# Patient Record
Sex: MALE | Race: OTHER | Employment: OTHER | URBAN - METROPOLITAN AREA
[De-identification: names, ages, dates, MRNs, and addresses within clinical notes are randomized per-mention and may not be internally consistent; named-entity substitution may affect disease eponyms.]

---

## 2017-04-07 RX ORDER — GLIMEPIRIDE 4 MG/1
TABLET ORAL
Qty: 30 TABLET | Refills: 0 | Status: SHIPPED | OUTPATIENT
Start: 2017-04-07 | End: 2017-05-14

## 2017-04-07 RX ORDER — QUINAPRIL 20 MG/1
TABLET ORAL
Qty: 30 TABLET | Refills: 0 | Status: SHIPPED | OUTPATIENT
Start: 2017-04-07 | End: 2017-05-14

## 2017-04-19 NOTE — TELEPHONE ENCOUNTER
GLIMEPIRIDE 4 MG Oral Tab 30 tablet 0 4/7/2017        Sig: TAKE 1 TABLET BY MOUTH EVERY MORNING BEFORE BREAKFAST      Notes to Pharmacy: WOULD YOU PLEASE REFILL      E-Prescribing Status: Receipt confirmed by pharmacy (4/7/2017  8:03 AM CDT)        Print T

## 2017-05-16 NOTE — TELEPHONE ENCOUNTER
Diabetes Medications; PROTOCOL FAILED. PLEASE ADVISE ON REFILL. THANKS. NIMESH, please call pt and schedule OV. PT WAS SUPPOSED TO F/U IN 11.2016 PER LAST OV NOTE. Thanks.     Protocol Criteria:  · Appointment scheduled in the past 6 months or the next 3 m

## 2017-05-17 NOTE — TELEPHONE ENCOUNTER
Pt informed appt required, pt will call back to book appt. Pharmacy checking status on refill. pls advise if approve or denied at this time?

## 2017-05-18 RX ORDER — QUINAPRIL 20 MG/1
TABLET ORAL
Qty: 30 TABLET | Refills: 0 | Status: SHIPPED | OUTPATIENT
Start: 2017-05-18 | End: 2017-08-18

## 2017-05-18 RX ORDER — GLIMEPIRIDE 4 MG/1
TABLET ORAL
Qty: 30 TABLET | Refills: 0 | Status: SHIPPED | OUTPATIENT
Start: 2017-05-18 | End: 2017-08-18

## 2017-07-25 RX ORDER — GLIMEPIRIDE 4 MG/1
TABLET ORAL
Qty: 30 TABLET | Refills: 0 | OUTPATIENT
Start: 2017-07-25

## 2017-08-18 ENCOUNTER — LAB ENCOUNTER (OUTPATIENT)
Dept: LAB | Age: 67
End: 2017-08-18
Attending: FAMILY MEDICINE
Payer: MEDICARE

## 2017-08-18 ENCOUNTER — OFFICE VISIT (OUTPATIENT)
Dept: FAMILY MEDICINE CLINIC | Facility: CLINIC | Age: 67
End: 2017-08-18

## 2017-08-18 VITALS
SYSTOLIC BLOOD PRESSURE: 207 MMHG | DIASTOLIC BLOOD PRESSURE: 91 MMHG | WEIGHT: 163 LBS | BODY MASS INDEX: 25 KG/M2 | HEART RATE: 44 BPM

## 2017-08-18 DIAGNOSIS — E11.9 TYPE 2 DIABETES MELLITUS WITHOUT COMPLICATION, WITHOUT LONG-TERM CURRENT USE OF INSULIN (HCC): Primary | ICD-10-CM

## 2017-08-18 DIAGNOSIS — I50.9 CONGESTIVE HEART FAILURE, UNSPECIFIED CONGESTIVE HEART FAILURE CHRONICITY, UNSPECIFIED CONGESTIVE HEART FAILURE TYPE: ICD-10-CM

## 2017-08-18 DIAGNOSIS — E11.9 TYPE 2 DIABETES MELLITUS WITHOUT COMPLICATION, WITHOUT LONG-TERM CURRENT USE OF INSULIN (HCC): ICD-10-CM

## 2017-08-18 LAB
ALBUMIN SERPL BCP-MCNC: 3.4 G/DL (ref 3.5–4.8)
ALBUMIN/GLOB SERPL: 1.2 {RATIO} (ref 1–2)
ALP SERPL-CCNC: 102 U/L (ref 32–100)
ALT SERPL-CCNC: 12 U/L (ref 17–63)
ANION GAP SERPL CALC-SCNC: 11 MMOL/L (ref 0–18)
AST SERPL-CCNC: 14 U/L (ref 15–41)
BASOPHILS # BLD: 0.1 K/UL (ref 0–0.2)
BASOPHILS NFR BLD: 1 %
BILIRUB SERPL-MCNC: 1.1 MG/DL (ref 0.3–1.2)
BUN SERPL-MCNC: 13 MG/DL (ref 8–20)
BUN/CREAT SERPL: 10.7 (ref 10–20)
CALCIUM SERPL-MCNC: 9.2 MG/DL (ref 8.5–10.5)
CHLORIDE SERPL-SCNC: 99 MMOL/L (ref 95–110)
CHOLEST SERPL-MCNC: 300 MG/DL (ref 110–200)
CO2 SERPL-SCNC: 25 MMOL/L (ref 22–32)
CREAT SERPL-MCNC: 1.22 MG/DL (ref 0.5–1.5)
CREAT UR-MCNC: 59.5 MG/DL
EOSINOPHIL # BLD: 0.1 K/UL (ref 0–0.7)
EOSINOPHIL NFR BLD: 1 %
ERYTHROCYTE [DISTWIDTH] IN BLOOD BY AUTOMATED COUNT: 13.5 % (ref 11–15)
GLOBULIN PLAS-MCNC: 2.9 G/DL (ref 2.5–3.7)
GLUCOSE SERPL-MCNC: 372 MG/DL (ref 70–99)
HCT VFR BLD AUTO: 39.9 % (ref 41–52)
HDLC SERPL-MCNC: 32 MG/DL
HGB BLD-MCNC: 14 G/DL (ref 13.5–17.5)
LDLC SERPL CALC-MCNC: 230 MG/DL (ref 0–99)
LYMPHOCYTES # BLD: 2.7 K/UL (ref 1–4)
LYMPHOCYTES NFR BLD: 34 %
MCH RBC QN AUTO: 30.4 PG (ref 27–32)
MCHC RBC AUTO-ENTMCNC: 35 G/DL (ref 32–37)
MCV RBC AUTO: 86.7 FL (ref 80–100)
MICROALBUMIN UR-MCNC: 201 MG/DL (ref 0–1.8)
MICROALBUMIN/CREAT UR: 3378.2 MG/G{CREAT} (ref 0–20)
MONOCYTES # BLD: 0.5 K/UL (ref 0–1)
MONOCYTES NFR BLD: 6 %
NEUTROPHILS # BLD AUTO: 4.7 K/UL (ref 1.8–7.7)
NEUTROPHILS NFR BLD: 59 %
NONHDLC SERPL-MCNC: 268 MG/DL
OSMOLALITY UR CALC.SUM OF ELEC: 295 MOSM/KG (ref 275–295)
PLATELET # BLD AUTO: 235 K/UL (ref 140–400)
PMV BLD AUTO: 9.9 FL (ref 7.4–10.3)
POTASSIUM SERPL-SCNC: 5.2 MMOL/L (ref 3.3–5.1)
PROT SERPL-MCNC: 6.3 G/DL (ref 5.9–8.4)
RBC # BLD AUTO: 4.6 M/UL (ref 4.5–5.9)
SODIUM SERPL-SCNC: 135 MMOL/L (ref 136–144)
TRIGL SERPL-MCNC: 192 MG/DL (ref 1–149)
WBC # BLD AUTO: 8 K/UL (ref 4–11)

## 2017-08-18 PROCEDURE — 82043 UR ALBUMIN QUANTITATIVE: CPT

## 2017-08-18 PROCEDURE — 80061 LIPID PANEL: CPT

## 2017-08-18 PROCEDURE — G0463 HOSPITAL OUTPT CLINIC VISIT: HCPCS | Performed by: FAMILY MEDICINE

## 2017-08-18 PROCEDURE — 36415 COLL VENOUS BLD VENIPUNCTURE: CPT

## 2017-08-18 PROCEDURE — 82570 ASSAY OF URINE CREATININE: CPT

## 2017-08-18 PROCEDURE — 80053 COMPREHEN METABOLIC PANEL: CPT

## 2017-08-18 PROCEDURE — 99214 OFFICE O/P EST MOD 30 MIN: CPT | Performed by: FAMILY MEDICINE

## 2017-08-18 PROCEDURE — 83036 HEMOGLOBIN GLYCOSYLATED A1C: CPT

## 2017-08-18 PROCEDURE — 85025 COMPLETE CBC W/AUTO DIFF WBC: CPT

## 2017-08-18 RX ORDER — QUINAPRIL 20 MG/1
TABLET ORAL
Qty: 30 TABLET | Refills: 5 | Status: SHIPPED | OUTPATIENT
Start: 2017-08-18 | End: 2018-06-02

## 2017-08-18 RX ORDER — SIMVASTATIN 40 MG
TABLET ORAL
Qty: 30 TABLET | Refills: 11 | Status: ON HOLD | OUTPATIENT
Start: 2017-08-18 | End: 2018-10-31

## 2017-08-18 RX ORDER — GLIMEPIRIDE 4 MG/1
TABLET ORAL
Qty: 30 TABLET | Refills: 5 | Status: SHIPPED | OUTPATIENT
Start: 2017-08-18 | End: 2018-05-05

## 2017-08-18 RX ORDER — FUROSEMIDE 20 MG/1
20 TABLET ORAL DAILY
Qty: 30 TABLET | Refills: 5 | Status: SHIPPED | OUTPATIENT
Start: 2017-08-18 | End: 2018-07-16

## 2017-08-18 NOTE — PROGRESS NOTES
HPI:    Patient ID: Chuck Pressley is a 78year old male. HPI  Patient presents with:  Diabetes: f/u medication, pt has not taken mediation in a month  CHF  Non compliant patient who rarely attends office visits has run out of his medications for month. diagnosis)  Congestive heart failure, unspecified congestive heart failure chronicity, unspecified congestive heart failure type (hcc)    May continue current medication. Discussed non compliance with office follow up. Labs ordered.  Reviewed last cardiolog

## 2017-08-19 LAB — HBA1C MFR BLD: 12.5 % (ref 4–6)

## 2018-05-15 RX ORDER — GLIMEPIRIDE 4 MG/1
TABLET ORAL
Qty: 30 TABLET | Refills: 0 | Status: SHIPPED | OUTPATIENT
Start: 2018-05-15 | End: 2018-05-22

## 2018-05-18 ENCOUNTER — PATIENT OUTREACH (OUTPATIENT)
Dept: CASE MANAGEMENT | Age: 68
End: 2018-05-18

## 2018-05-18 NOTE — PROGRESS NOTES
Outreached to patient in regards to enrollment to Chronic Care Management program. Left message for patient to return my call at ext. [phone]. Thank you.

## 2018-05-29 NOTE — PROGRESS NOTES
Outreached to patient a second time in regards to enrollment to Chronic Care Management program. Left message for patient to return my call at ext. [phone]. Thank you.

## 2018-06-01 RX ORDER — GLIMEPIRIDE 4 MG/1
TABLET ORAL
Qty: 30 TABLET | Refills: 0 | Status: ON HOLD | OUTPATIENT
Start: 2018-06-01 | End: 2018-10-31

## 2018-06-04 NOTE — TELEPHONE ENCOUNTER
CSS, please contact patient and assist in scheduling overdue f/u (LOV= 8/18/17 and should be seen at least every 6 months for HTN). Once scheduled please route back for refill review.     Hypertensive Medications  Protocol Criteria:  · Appointment scheduled

## 2018-06-11 RX ORDER — QUINAPRIL 20 MG/1
TABLET ORAL
Qty: 30 TABLET | Refills: 0 | Status: ON HOLD | OUTPATIENT
Start: 2018-06-11 | End: 2018-10-31

## 2018-06-11 NOTE — TELEPHONE ENCOUNTER
Spoke with patient (name and  verified), reviewed information, patient verbalized understanding and agrees with plan.   Patient agreed to call back and make appt within the next 30 days, erx sent

## 2018-07-20 RX ORDER — FUROSEMIDE 20 MG/1
20 TABLET ORAL DAILY
Qty: 30 TABLET | Refills: 0 | Status: ON HOLD | OUTPATIENT
Start: 2018-07-20 | End: 2018-10-31

## 2018-07-20 RX ORDER — GLIMEPIRIDE 4 MG/1
TABLET ORAL
Qty: 30 TABLET | Refills: 0 | Status: SHIPPED | OUTPATIENT
Start: 2018-07-20 | End: 2018-10-24

## 2018-09-25 NOTE — TELEPHONE ENCOUNTER
Failed per nursing protocol - please advise on refill request     Diabetes Medications  Protocol Criteria:  · Appointment scheduled in the past 6 months or the next 3 months  · A1C < 7.5 in the past 6 months  · Creatinine in the past 12 months  · Creatini Office Visit    2 years ago Type II or unspecified type diabetes mellitus without mention of complication, not stated as uncontrolled [facility])    [facility], [facility], [address], [place]    Office Visit    2 years ago Localized edema

## 2018-10-24 ENCOUNTER — APPOINTMENT (OUTPATIENT)
Dept: GENERAL RADIOLOGY | Facility: HOSPITAL | Age: 68
DRG: 242 | End: 2018-10-24
Attending: EMERGENCY MEDICINE
Payer: MEDICARE

## 2018-10-24 ENCOUNTER — APPOINTMENT (OUTPATIENT)
Dept: ULTRASOUND IMAGING | Facility: HOSPITAL | Age: 68
DRG: 242 | End: 2018-10-24
Attending: HOSPITALIST
Payer: MEDICARE

## 2018-10-24 ENCOUNTER — HOSPITAL ENCOUNTER (INPATIENT)
Facility: HOSPITAL | Age: 68
LOS: 7 days | Discharge: HOME HEALTH | DRG: 242 | End: 2018-10-31
Attending: EMERGENCY MEDICINE | Admitting: HOSPITALIST
Payer: MEDICARE

## 2018-10-24 ENCOUNTER — OFFICE VISIT (OUTPATIENT)
Dept: FAMILY MEDICINE CLINIC | Facility: CLINIC | Age: 68
End: 2018-10-24
Payer: MEDICARE

## 2018-10-24 VITALS
SYSTOLIC BLOOD PRESSURE: 179 MMHG | HEART RATE: 56 BPM | RESPIRATION RATE: 16 BRPM | DIASTOLIC BLOOD PRESSURE: 68 MMHG | TEMPERATURE: 98 F

## 2018-10-24 DIAGNOSIS — R73.9 HYPERGLYCEMIA: Primary | ICD-10-CM

## 2018-10-24 DIAGNOSIS — R55 SYNCOPE AND COLLAPSE: ICD-10-CM

## 2018-10-24 DIAGNOSIS — E11.65 UNCONTROLLED DIABETES MELLITUS WITH COMPLICATIONS (HCC): ICD-10-CM

## 2018-10-24 DIAGNOSIS — I50.9 ACUTE ON CHRONIC CONGESTIVE HEART FAILURE, UNSPECIFIED HEART FAILURE TYPE (HCC): ICD-10-CM

## 2018-10-24 DIAGNOSIS — R77.8 ELEVATED TROPONIN: ICD-10-CM

## 2018-10-24 DIAGNOSIS — E11.8 UNCONTROLLED DIABETES MELLITUS WITH COMPLICATIONS (HCC): ICD-10-CM

## 2018-10-24 PROBLEM — R79.89 ELEVATED TROPONIN: Status: ACTIVE | Noted: 2018-10-24

## 2018-10-24 PROCEDURE — 99213 OFFICE O/P EST LOW 20 MIN: CPT | Performed by: FAMILY MEDICINE

## 2018-10-24 PROCEDURE — 76775 US EXAM ABDO BACK WALL LIM: CPT | Performed by: HOSPITALIST

## 2018-10-24 PROCEDURE — 72110 X-RAY EXAM L-2 SPINE 4/>VWS: CPT | Performed by: EMERGENCY MEDICINE

## 2018-10-24 PROCEDURE — 99223 1ST HOSP IP/OBS HIGH 75: CPT | Performed by: HOSPITALIST

## 2018-10-24 PROCEDURE — 71045 X-RAY EXAM CHEST 1 VIEW: CPT | Performed by: EMERGENCY MEDICINE

## 2018-10-24 PROCEDURE — G0463 HOSPITAL OUTPT CLINIC VISIT: HCPCS | Performed by: FAMILY MEDICINE

## 2018-10-24 RX ORDER — ONDANSETRON 2 MG/ML
4 INJECTION INTRAMUSCULAR; INTRAVENOUS EVERY 6 HOURS PRN
Status: DISCONTINUED | OUTPATIENT
Start: 2018-10-24 | End: 2018-10-31

## 2018-10-24 RX ORDER — NITROGLYCERIN 0.4 MG/1
0.4 TABLET SUBLINGUAL EVERY 5 MIN PRN
Status: DISCONTINUED | OUTPATIENT
Start: 2018-10-24 | End: 2018-10-24

## 2018-10-24 RX ORDER — ASPIRIN 81 MG/1
324 TABLET, CHEWABLE ORAL ONCE
Status: COMPLETED | OUTPATIENT
Start: 2018-10-24 | End: 2018-10-24

## 2018-10-24 RX ORDER — HYDRALAZINE HYDROCHLORIDE 20 MG/ML
10 INJECTION INTRAMUSCULAR; INTRAVENOUS ONCE
Status: COMPLETED | OUTPATIENT
Start: 2018-10-24 | End: 2018-10-24

## 2018-10-24 RX ORDER — HEPARIN SODIUM 5000 [USP'U]/ML
5000 INJECTION, SOLUTION INTRAVENOUS; SUBCUTANEOUS EVERY 12 HOURS SCHEDULED
Status: DISCONTINUED | OUTPATIENT
Start: 2018-10-24 | End: 2018-10-31

## 2018-10-24 RX ORDER — ASPIRIN 325 MG
325 TABLET ORAL DAILY
Status: DISCONTINUED | OUTPATIENT
Start: 2018-10-25 | End: 2018-10-31

## 2018-10-24 RX ORDER — SODIUM CHLORIDE 0.9 % (FLUSH) 0.9 %
10 SYRINGE (ML) INJECTION AS NEEDED
Status: DISCONTINUED | OUTPATIENT
Start: 2018-10-24 | End: 2018-10-26

## 2018-10-24 RX ORDER — SODIUM CHLORIDE 0.9 % (FLUSH) 0.9 %
3 SYRINGE (ML) INJECTION AS NEEDED
Status: DISCONTINUED | OUTPATIENT
Start: 2018-10-24 | End: 2018-10-31

## 2018-10-24 RX ORDER — ACETAMINOPHEN 325 MG/1
650 TABLET ORAL EVERY 6 HOURS PRN
Status: DISCONTINUED | OUTPATIENT
Start: 2018-10-24 | End: 2018-10-31

## 2018-10-24 RX ORDER — FUROSEMIDE 10 MG/ML
40 INJECTION INTRAMUSCULAR; INTRAVENOUS
Status: DISCONTINUED | OUTPATIENT
Start: 2018-10-24 | End: 2018-10-28

## 2018-10-24 RX ORDER — MORPHINE SULFATE 4 MG/ML
4 INJECTION, SOLUTION INTRAMUSCULAR; INTRAVENOUS ONCE
Status: COMPLETED | OUTPATIENT
Start: 2018-10-24 | End: 2018-10-24

## 2018-10-24 RX ORDER — FUROSEMIDE 10 MG/ML
40 INJECTION INTRAMUSCULAR; INTRAVENOUS ONCE
Status: COMPLETED | OUTPATIENT
Start: 2018-10-24 | End: 2018-10-24

## 2018-10-24 RX ORDER — NITROGLYCERIN 0.4 MG/1
0.4 TABLET SUBLINGUAL EVERY 5 MIN PRN
Status: DISCONTINUED | OUTPATIENT
Start: 2018-10-24 | End: 2018-10-31

## 2018-10-24 NOTE — PROGRESS NOTES
HPI:    Patient ID: Carin Kitchen is a 80year old male. Pt presents to office today after a ? Seizure yesterday night. Pt claims he felt back abarca and was unable to get up. Pt had been shaking but claims he did not lose consciousness.  Paramedics arrive normal heart sounds and intact distal pulses. Pulmonary/Chest: Effort normal and breath sounds normal.   Neurological: He is alert and oriented to person, place, and time. unsteady   Psychiatric: He has a normal mood and affect. Vitals reviewed.

## 2018-10-24 NOTE — CONSULTS
Cardiology (consult dictated)    Assessment:  1. Acute on chronic congestive heart failure, manifested primarily by severe lower extremity edema. 2.  Acute kidney injury    3. Rhabdomyolysis    4. Marked dyslipidemia    5.   Uncontrolled diabetes    6

## 2018-10-24 NOTE — ED PROVIDER NOTES
Patient Seen in: [facility] Emergency Department    History   Patient presents with:  Hyperglycemia (metabolic)    Stated Complaint: AMS, BS >500    HPI    52-year-old male with history of hypertension, diabetes, coronary artery disease post CABG complaint: AMS, BS >500  Other systems are as noted in HPI. Constitutional and vital signs reviewed. All other systems reviewed and negative except as noted above.     Physical Exam     ED Triage Vitals   BP 10/24/18 1624 (!) 176/59   Pulse 10/24/18 1 other components within normal limits   BNP (B TYPE NATRIUERTIC PEPTIDE) - Abnormal; Notable for the following components:    Beta Natriuretic Peptide 811 (*)     All other components within normal limits   TROPONIN I - Abnormal; Notable for the following Report. Rate: 67  Rhythm: Undetermined rhythm  Axis: Normal  Reading: Right bundle branch block nonspecific EKG                  MDM   Lab, x-ray, and EKG results noted. Patient started on an insulin drip in the emergency department.   Will also give [facility] ADULT MENTAL [facility]

## 2018-10-24 NOTE — ED INITIAL ASSESSMENT (HPI)
Pt is noncompliant diabetic who was found on the floor last night by his wife. There was an unknown down time and patient does not know what happened. Blood sugar over 500 yesterday.   Refused transport by paramedics last night and sent from PMD's office

## 2018-10-25 ENCOUNTER — APPOINTMENT (OUTPATIENT)
Dept: CV DIAGNOSTICS | Facility: HOSPITAL | Age: 68
DRG: 242 | End: 2018-10-25
Attending: HOSPITALIST
Payer: MEDICARE

## 2018-10-25 ENCOUNTER — APPOINTMENT (OUTPATIENT)
Dept: ULTRASOUND IMAGING | Facility: HOSPITAL | Age: 68
DRG: 242 | End: 2018-10-25
Attending: INTERNAL MEDICINE
Payer: MEDICARE

## 2018-10-25 PROCEDURE — 99233 SBSQ HOSP IP/OBS HIGH 50: CPT | Performed by: HOSPITALIST

## 2018-10-25 PROCEDURE — 93306 TTE W/DOPPLER COMPLETE: CPT | Performed by: HOSPITALIST

## 2018-10-25 PROCEDURE — 93880 EXTRACRANIAL BILAT STUDY: CPT | Performed by: INTERNAL MEDICINE

## 2018-10-25 PROCEDURE — 99222 1ST HOSP IP/OBS MODERATE 55: CPT | Performed by: INTERNAL MEDICINE

## 2018-10-25 RX ORDER — HYDRALAZINE HYDROCHLORIDE 25 MG/1
25 TABLET, FILM COATED ORAL EVERY 8 HOURS SCHEDULED
Status: DISCONTINUED | OUTPATIENT
Start: 2018-10-25 | End: 2018-10-31

## 2018-10-25 RX ORDER — AMLODIPINE BESYLATE 5 MG/1
5 TABLET ORAL
Status: DISCONTINUED | OUTPATIENT
Start: 2018-10-25 | End: 2018-10-31

## 2018-10-25 RX ORDER — HYDRALAZINE HYDROCHLORIDE 25 MG/1
25 TABLET, FILM COATED ORAL ONCE
Status: COMPLETED | OUTPATIENT
Start: 2018-10-25 | End: 2018-10-25

## 2018-10-25 RX ORDER — 0.9 % SODIUM CHLORIDE 0.9 %
VIAL (ML) INJECTION
Status: COMPLETED
Start: 2018-10-25 | End: 2018-10-25

## 2018-10-25 RX ORDER — DEXTROSE MONOHYDRATE 25 G/50ML
50 INJECTION, SOLUTION INTRAVENOUS AS NEEDED
Status: DISCONTINUED | OUTPATIENT
Start: 2018-10-25 | End: 2018-10-31

## 2018-10-25 RX ORDER — POTASSIUM CHLORIDE 20 MEQ/1
40 TABLET, EXTENDED RELEASE ORAL EVERY 4 HOURS
Status: COMPLETED | OUTPATIENT
Start: 2018-10-25 | End: 2018-10-25

## 2018-10-25 RX ORDER — 0.9 % SODIUM CHLORIDE 0.9 %
VIAL (ML) INJECTION
Status: DISPENSED
Start: 2018-10-25 | End: 2018-10-26

## 2018-10-25 RX ORDER — AMLODIPINE BESYLATE 5 MG/1
5 TABLET ORAL ONCE
Status: COMPLETED | OUTPATIENT
Start: 2018-10-25 | End: 2018-10-25

## 2018-10-25 NOTE — CARDIAC REHAB
CARDIAC REHAB HEART FAILURE EDUCATION    Handouts provided and reviewed: Tucker Moran Activity: Chair for all meals:        Ambulation:        Tolerated Activity:          Disease Process: Disease process reviewed.     Reviewed the following: DAILY WEIGHT [place]

## 2018-10-25 NOTE — H&P
[facility]    PATIENT'S NAME: Ho [place] PHYSICIAN: Vikas Tapia MD   PATIENT ACCOUNT#:   [id number]    LOCATION:  [place] [address]  MEDICAL RECORD #:   [id number]       YOB: 1950  ADMISSION DATE:       10/24/20 FAMILY HISTORY:  Positive for coronary artery disease. SOCIAL HISTORY:  Ex-tobacco user. No current tobacco, alcohol, or drug use. Lives with his wife and is usually independent with basic activities of daily living.     REVIEW OF SYSTEMS:  The kristine patient has been on quinapril I will try to avoid beta blockers or ACE inhibitors for now considering his bradycardia and kidney function. Use hydralazine as needed. Further recommendations to follow.     Dictated By Angela Betts MD  d: 10/24/2018 18:3

## 2018-10-25 NOTE — PROGRESS NOTES
[facility]    Progress Note    Mk Jean Patient Status:  Inpatient    1950 MRN [id number]   Location [facility] 2W/SW Attending Samreen Jones MD   Hosp Day # 1 PCP Bela Shannon DO       Subjective:    Mk Jean  (H) 07/14/2016    MG 2.0 10/25/2018    TROP 0.32 (HH) 10/25/2018    CK 3,169 (H) 10/24/2018       Xr Lumbar Spine (min 4 Views) (cpt=72110)    Result Date: 10/24/2018  CONCLUSION:  1. Scoliosis. 2. Demineralization.  3. Moderately advanced osteoarth Diabetes  -secondary to non-compliance  -plan levemir/novolog and cf  -blair endo eval    Acute Kidney Failure  -improving     CAD s/p CABG    Dyslipidemia    HTN  -cont amlodipine and   Carotid stenosis  -will need to be addressed    Medication non-complian

## 2018-10-25 NOTE — PLAN OF CARE
SKIN/TISSUE INTEGRITY - ADULT    • Skin integrity remains intact Not Progressing    Legs with plus 4 lower pitting edema. Multiple open blisters and abrasions noted to bilateral  Extremities. Lasix IVP as ordered. Raise legs up on \pillows.        Danica Em optimal ventilation and oxygenation Progressing    Room Air. Encourage sitting up in chair, coughing and deep breathing.      RISK FOR INFECTION - ADULT    • Absence of fever/infection during anticipated neutropenic period Progressing        SAFETY ADULT -

## 2018-10-25 NOTE — PLAN OF CARE
Problem: Patient Centered Care  Goal: Patient preferences are identified and integrated in the patient's plan of care  Interventions:  - What would you like us to know as we care for you? People call me Román for short.    - Provide timely, complete, and ac trends  - Monitor for bleeding, hypotension and signs of decreased cardiac output  - Evaluate effectiveness of vasoactive medications to optimize hemodynamic stability  - Monitor arterial and/or venous puncture sites for bleeding and/or hematoma  - Assess Advance diet as tolerated, if ordered  - Obtain nutritional consult as needed  - Evaluate fluid balance  Outcome: Progressing    Goal: Maintains adequate nutritional intake (undernourished)  INTERVENTIONS:  - Monitor percentage of each meal consumed  - Griselda Ill ADULT - FALL  Goal: Free from fall injury  INTERVENTIONS:  - Assess pt frequently for physical needs  - Identify cognitive and physical deficits and behaviors that affect risk of falls.   - [place] fall precautions as indicated by assessment.  - Educate p

## 2018-10-25 NOTE — CONSULTS
[facility]    Report of Consultation    Daisy Kowalski Patient Status:  Inpatient    1950 MRN [id number]   Location [facility] 2W/SW Attending Yamilex Lorenzo MD   Hosp Day # 1 PCP Jordan Lees DO     Date of Admission: Subcutaneous, TID CC  •  dextrose 50 % injection 50 mL, 50 mL, Intravenous, PRN  •  Glucose-Vitamin C (DEX-4) 4-6 GM-MG chewable tab 4 tablet, 4 tablet, Oral, Q15 Min PRN  •  glucose (DEX4) oral liquid 15 g, 15 g, Oral, Q15 Min PRN  •  Insulin Aspart Pen ( (coronary artery disease)     Hypercholesteremia     Atrial fibrillation (HCC)     HTN (hypertension)     Hyperglycemia     Elevated troponin     Acute on chronic congestive heart failure, unspecified heart failure type ([facility] [address]     [facility]      1

## 2018-10-25 NOTE — PROGRESS NOTES
[facility]  Progress Note    Huy Smith Patient Status:  Inpatient    1950 MRN [id number]   Location [facility] 2W/SW Attending Kristi Archuleta MD   Hosp Day # 1 PCP Kayla Crawford DO     Assessment:    1.   Congestive heart fail 2+ no bruits. Cardiac: Regular rate and rhythm, S1, S2 normal, no murmur  Lungs: Clear without wheezes, rales, rhonchi. Normal excursions and effort. Abdomen: Soft, non-tender. Extremities: 1+ pedal/ankle edema  Skin: Warm and dry.      Labs:  Lab Resu

## 2018-10-25 NOTE — CONSULTS
[facility]    PATIENT'S NAME: [place], [place]   ATTENDING PHYSICIAN: Jose Le MD   CONSULTING PHYSICIAN: Meme Nunez.  Ferny Paniagua MD   PATIENT ACCOUNT#:   [id number]    LOCATION:  [place] [address]  MEDICAL RECORD #:   [id number]       DATE OF BIRTH: EXAMINATION:    GENERAL:  Well-developed, well-nourished male in no acute distress. Alert and oriented x3. VITAL SIGNS:  Blood pressure is 185/93; pulse 55, regular rhythm; respirations 16, breathing unlabored; afebrile; saturation 99%.     HEENT:  Zina Kawasaki inhibitor. Treat blood pressure with hydralazine and amlodipine. Thank you for this consultation. Dictated By Ashly Todd.  Celia Rodriguez MD  d: 10/24/2018 18:47:49  t: 10/24/2018 02:86:79  Hedy Krabbe [id number]  [facility]/

## 2018-10-25 NOTE — DIABETES ED
[facility] - [facility]    Diabetes Education  Note    Allayne Medici Patient Status:  Inpatient   1950 MRN [id number]  Location [facility] 2W/SW Attending Estelle Boyd MD  Hosp Day # 1 PCP Unique Cohn DO    Discussion:  Met with

## 2018-10-26 PROCEDURE — 99233 SBSQ HOSP IP/OBS HIGH 50: CPT | Performed by: HOSPITALIST

## 2018-10-26 PROCEDURE — 99232 SBSQ HOSP IP/OBS MODERATE 35: CPT | Performed by: INTERNAL MEDICINE

## 2018-10-26 RX ORDER — POTASSIUM CHLORIDE 20 MEQ/1
40 TABLET, EXTENDED RELEASE ORAL EVERY 4 HOURS
Status: COMPLETED | OUTPATIENT
Start: 2018-10-26 | End: 2018-10-26

## 2018-10-26 NOTE — PROGRESS NOTES
Transferred pt to telemetry room 342, on bed, report called previously to [facility], update given.

## 2018-10-26 NOTE — CONSULTS
[facility]    Cardiac Electrophysiology Consultation  [facility]    Ted Chris Location: [facility] 2W/    1950 MRN [id number]   Consulting Date 10/26/2018 [facility] [id number]   Consulting Physician Magy Patel MD Attending y (20 MG TOTAL) BY MOUTH DAILY. Disp: 30 tablet Rfl: 0   QUINAPRIL HCL 20 MG Oral Tab TAKE 1 TABLET (20 MG TOTAL) BY MOUTH NIGHTLY.  Disp: 30 tablet Rfl: 0   GLIMEPIRIDE 4 MG Oral Tab TAKE 1 TABLET BY MOUTH EVERY MORNING BEFORE BREAKFAST Disp: 30 tablet Rfl: calm.    Data:  ECG: sinus bradycardia 54 bpm, RBBB/LAFB  TELE: sinus, type 1 second degree AV block, RBBB/lAFB, no pauses  ECHO: EF 55-60%    Lab Results   Component Value Date    WBC 8.5 10/26/2018    HGB 11.0 10/26/2018    HCT 30.9 10/26/2018

## 2018-10-26 NOTE — PLAN OF CARE
CARDIOVASCULAR - ADULT    • Maintains optimal cardiac output and hemodynamic stability Not Progressing    • Absence of cardiac arrhythmias or at baseline Not Progressing        Impaired Activities of Daily Living    • Achieve highest/safest level of indepe

## 2018-10-26 NOTE — PROGRESS NOTES
[facility]  Progress Note    Ambardaisha Hernandez Patient Status:  Inpatient    1950 MRN [id number]   Location [facility] 2W/SW Attending Julia Joe MD   Hosp Day # 2 PCP Lulu Clark DO     Assessment:    1.   Congestive heart fail at 10/26/2018 0835  Last data filed at 10/26/2018 0500  Gross per 24 hour   Intake 235 ml   Output 2500 ml   Net -2265 ml       Wt Readings from Last 2 Encounters:  10/26/18 : 141 lb 14.4 oz (64.4 kg)  08/18/17 : 163 lb (73.9 kg)      Physical Exam:    Gen

## 2018-10-26 NOTE — PROGRESS NOTES
[facility]    Progress Note    Den Carrington Patient Status:  Inpatient    1950 MRN [id number]   Location [facility] 2W/SW Attending Pérez Anthony MD   Hosp Day # 2 PCP Giuseppe Richardson DO     Subjective:  Feels okay  Ap DM management. PLAN;  - Levemir : decrease to 24 daily  - novolog : 5 along with medium  dose CF with meals  - Accuchecks AC and HS  - hypoglycemia protocol  - Diabetes diet    Discussed importance of compliance with medication and regular FU .      We

## 2018-10-26 NOTE — PLAN OF CARE
Absence of cardiac arrhythmias or at baseline Not Progressing    Pt remains bradycardic w/Wenchebach heart block, pt did have dizziness when stood from dangle position, SBP dropped approx 20, no change sx's once sitting in chair & BP stablized, later becam

## 2018-10-26 NOTE — H&P (VIEW-ONLY)
[facility]    Cardiac Electrophysiology Consultation  ALANIS Ramon Dempsey Location: [facility] 2W/    1950 MRN [id number]   Consulting Date 10/26/2018 [facility] [id number]   Consulting Physician Daisy Kimble MD Attending Phy (20 MG TOTAL) BY MOUTH DAILY. Disp: 30 tablet Rfl: 0   QUINAPRIL HCL 20 MG Oral Tab TAKE 1 TABLET (20 MG TOTAL) BY MOUTH NIGHTLY.  Disp: 30 tablet Rfl: 0   GLIMEPIRIDE 4 MG Oral Tab TAKE 1 TABLET BY MOUTH EVERY MORNING BEFORE BREAKFAST Disp: 30 tablet Rfl: calm.    Data:  ECG: sinus bradycardia 54 bpm, RBBB/LAFB  TELE: sinus, type 1 second degree AV block, RBBB/lAFB, no pauses  ECHO: EF 55-60%    Lab Results   Component Value Date    WBC 8.5 10/26/2018    HGB 11.0 10/26/2018    HCT 30.9 10/26/2018

## 2018-10-26 NOTE — PROGRESS NOTES
[facility]    Progress Note    Carin Patch Patient Status:  Inpatient    1950 MRN [id number]   Location [facility] 2W/SW Attending Justin Cleaning MD   Hosp Day # 2 PCP Samira Bear DO       Subjective:    Carin Patch TSH 4.39 10/24/2018    TSH 3.93 10/24/2018     (H) 07/14/2016    MG 1.9 10/25/2018    TROP 0.32 (HH) 10/25/2018    CK 3,169 (H) 10/24/2018       Xr Lumbar Spine (min 4 Views) (cpt=72110)    Result Date: 10/24/2018  CONCLUSION:  1. Scoliosis.  2. D of 10/24/2018 18:11:58 No change Electronically signed on 10/25/2018 at 12:33 by Patricia Cooney MD    Ekg 12-lead    Result Date: 10/24/2018  ECG Report  Interpretation  -------------------------- possible atrial fibrillation (difficult to see distinct ALYSHA moreno LOULOU.

## 2018-10-26 NOTE — CARDIAC REHAB
CARDIAC REHAB HEART FAILURE EDUCATION    Handouts provided and reviewed: [facility] Booklet. Activity: Chair for all meals: Reviewed       Ambulation: Reviewed       Tolerated Activity:          Disease Process: Disease process reviewed.

## 2018-10-27 PROCEDURE — 99232 SBSQ HOSP IP/OBS MODERATE 35: CPT | Performed by: INTERNAL MEDICINE

## 2018-10-27 PROCEDURE — 99233 SBSQ HOSP IP/OBS HIGH 50: CPT | Performed by: HOSPITALIST

## 2018-10-27 RX ORDER — MAGNESIUM OXIDE 400 MG (241.3 MG MAGNESIUM) TABLET
400 TABLET ONCE
Status: COMPLETED | OUTPATIENT
Start: 2018-10-27 | End: 2018-10-27

## 2018-10-27 NOTE — PROGRESS NOTES
[facility]  Progress Note    Mike Kaye Patient Status:  Inpatient    1950 MRN [id number]   Location [facility] 3W/SW Attending Zeb Bauman MD   Hosp Day # 3 PCP Laly Escobar DO     Assessment:  1.   AV block - at times on te HGB 10.5 10/27/2018    HCT 30.3 10/27/2018     10/27/2018     No results found for: PT, INR  Lab Results   Component Value Date     10/27/2018    K 4.6 10/27/2018     10/27/2018    CO2 22 10/27/2018    BUN 27 10/27/2018    CREATSERUM 2. 0

## 2018-10-27 NOTE — PROGRESS NOTES
[facility]    Progress Note    Pepitoas Mansoor Patient Status:  Inpatient    1950 MRN [id number]   Location [facility] 2W/SW Attending Scotty Muro MD   Hosp Day # 3 PCP Frank Garibay DO       Subjective:    Carmine Max 10/24/2018    TSH 4.39 10/24/2018    TSH 3.93 10/24/2018     (H) 07/14/2016    MG 1.8 10/27/2018    TROP 0.32 (HH) 10/25/2018    CK 3,169 (H) 10/24/2018           Ekg 12-lead    Result Date: 10/25/2018  ECG Report  Interpretation  ------------------

## 2018-10-27 NOTE — PROGRESS NOTES
[facility]    Progress Note    Te Bauer Patient Status:  Inpatient    1950 MRN [id number]   Location [facility] 2W/SW Attending Dang Guajardo MD   Hosp Day # 3 PCP Theresa Matta DO     Subjective:  Feels okay  Ap to 28 daily  - novolog : 6 along with medium  dose CF with meals  - Accuchecks AC and HS  - hypoglycemia protocol  - Diabetes diet    Discussed importance of compliance with medication and regular FU . We will follow.      Jesusita Homans

## 2018-10-28 PROCEDURE — 99233 SBSQ HOSP IP/OBS HIGH 50: CPT | Performed by: HOSPITALIST

## 2018-10-28 RX ORDER — FUROSEMIDE 40 MG/1
40 TABLET ORAL DAILY
Status: DISCONTINUED | OUTPATIENT
Start: 2018-10-29 | End: 2018-10-31

## 2018-10-28 NOTE — PLAN OF CARE
Problem: Patient Centered Care  Goal: Patient preferences are identified and integrated in the patient's plan of care  Interventions:  - What would you like us to know as we care for you? People call me Román for short.    - Provide timely, complete, and ac signs of decreased cardiac output  - Evaluate effectiveness of vasoactive medications to optimize hemodynamic stability  - Monitor arterial and/or venous puncture sites for bleeding and/or hematoma  - Assess quality of pulses, skin color and temperature  - nutritional consult as needed  - Evaluate fluid balance  Outcome: Progressing    Goal: Maintains adequate nutritional intake (undernourished)  INTERVENTIONS:  - Monitor percentage of each meal consumed  - Identify factors contributing to decreased intake, alignment per provider's orders  - Instruct and reinforce with patient and family use of appropriate assistive device and precautions (e.g. spinal or hip dislocation precautions)  Outcome: Progressing      Problem: NEUROLOGICAL - ADULT  Goal: Achieves stab management  - Manage/alleviate anxiety  - Utilize distraction and/or relaxation techniques  - Monitor for opioid side effects  - Notify MD/LIP if interventions unsuccessful or patient reports new pain  - Anticipate increased pain with activity and pre-medi

## 2018-10-28 NOTE — PROGRESS NOTES
[facility]    Progress Note    Carin Patch Patient Status:  Inpatient    1950 MRN [id number]   Location [facility] 2W/SW Attending Justin Cleaning MD   Hosp Day # 4 PCP Saimra Bear DO       Subjective:    Carin Patch TSH 4.39 10/24/2018    TSH 3.93 10/24/2018     (H) 07/14/2016    MG 2.0 10/28/2018    TROP 0.32 (HH) 10/25/2018    CK 3,169 (H) 10/24/2018           Ekg 12-lead    Result Date: 10/28/2018  ECG Report  Interpretation  -------------------------- Sinus

## 2018-10-28 NOTE — PROGRESS NOTES
[facility]  Progress Note    Jose A Gipson Patient Status:  Inpatient    1950 MRN [id number]   Location [facility] 3W/SW Attending Sara Mendosa MD   Hosp Day # 4 PCP Pattie Cordova DO     Assessment:  1.   AV block - currently seem 10/28/2018    CO2 24 10/28/2018    BUN 28 10/28/2018    CREATSERUM 1.75 10/28/2018     10/28/2018    MG 2.0 10/28/2018    CA 8.0 10/28/2018        Lab Results   Component Value Date    TROP 0.32 () 10/25/2018    TROP 0.36 (HH) 10/24/2018    TROP 0

## 2018-10-29 PROCEDURE — 99233 SBSQ HOSP IP/OBS HIGH 50: CPT | Performed by: HOSPITALIST

## 2018-10-29 PROCEDURE — 99232 SBSQ HOSP IP/OBS MODERATE 35: CPT | Performed by: INTERNAL MEDICINE

## 2018-10-29 RX ORDER — CHLORHEXIDINE GLUCONATE 4 G/100ML
30 SOLUTION TOPICAL
Status: COMPLETED | OUTPATIENT
Start: 2018-10-30 | End: 2018-10-30

## 2018-10-29 RX ORDER — SODIUM CHLORIDE 9 MG/ML
INJECTION, SOLUTION INTRAVENOUS CONTINUOUS
Status: DISCONTINUED | OUTPATIENT
Start: 2018-10-29 | End: 2018-10-31

## 2018-10-29 RX ORDER — POTASSIUM CHLORIDE 20 MEQ/1
40 TABLET, EXTENDED RELEASE ORAL ONCE
Status: COMPLETED | OUTPATIENT
Start: 2018-10-29 | End: 2018-10-29

## 2018-10-29 RX ORDER — CEFAZOLIN SODIUM/WATER 2 G/20 ML
2 SYRINGE (ML) INTRAVENOUS
Status: COMPLETED | OUTPATIENT
Start: 2018-10-29 | End: 2018-10-30

## 2018-10-29 NOTE — CM/SW NOTE
Per rounds, patient having difficulty paying for medications. [facility] $4 list provided to patient. Encouraged patient to review and discuss w/ MD. Also suggested EZ2CAD. Patient/wife verbalized understanding of the above.   Rosanna Nj [facility] Aleah Mccracken 84

## 2018-10-29 NOTE — PROGRESS NOTES
[facility] Consult Follow Up Note      Jose A Gipson Patient Status:  Inpatient    1950 MRN [id number]   Location [facility] 3W/SW Attending Sara Mendosa MD   Hosp Day # 5 PCP Pattie Cordova DO     Reason for Consu (!) 45, temperature 98.2 °F (36.8 °C), temperature source Oral, resp. rate 17, height 5' 8\" (1.727 m), weight 154 lb 14.4 oz (70.3 kg), SpO2 96 %.   Temp (24hrs), Av °F (36.7 °C), Min:97.5 °F (36.4 °C), Max:98.4 °F (36.9 °C)    Wt Readings from Last 3 need for revision or extraction, and death. We discussed the need for post-procedure arm movement restriction for 3 months, use of a sling at night for one month, required follow up in our office, and future generator replacements.  Initial questions were a

## 2018-10-29 NOTE — PROGRESS NOTES
[facility]    Progress Note    Enid Valente Patient Status:  Inpatient    1950 MRN [id number]   Location [facility] 3W/SW Attending Talon Leiva MD   Hosp Day # 5 PCP Donya Dyer DO        Subjective:     Constitu Lab Results   Component Value Date    WBC 9.9 10/28/2018    HGB 10.5 (L) 10/28/2018    HCT 29.9 (L) 10/28/2018     10/28/2018    CREATSERUM 1.65 (H) 10/29/2018    BUN 25 (H) 10/29/2018     10/29/2018    K 3.8 10/29/2018     10/29/201

## 2018-10-29 NOTE — DIABETES ED
[facility]    Diabetes Education  Note    Cristianche Solis Patient Status:  Inpatient   1950 MRN [id number]  Location [facility] 3W/SW Attending Aleksey Giang MD  Hosp Day # 5 PCP Leonie Lay DO    Reason for Visit: Insu

## 2018-10-29 NOTE — CARDIAC REHAB
CARDIAC REHAB HEART FAILURE EDUCATION    Handouts provided and reviewed: CHF Booklet. Activity: Chair for all meals: Reviewed       Ambulation: Reviewed       Tolerated Activity:          Disease Process: Disease process reviewed.     Reviewed the mic

## 2018-10-29 NOTE — PROGRESS NOTES
[facility]    Progress Note    Mike Kaye Patient Status:  Inpatient    1950 MRN [id number]   Location [facility] 2W/SW Attending Zeb Bauman MD   Hosp Day # 5 PCP Laly Escobar DO     Subjective:  Feels okay  Ap Decrease to 24 daily  - novolog : 10--> 8  along with medium  dose CF with meals  - Accuchecks AC and HS  - hypoglycemia protocol  - Diabetes diet    Discussed importance of compliance with medication and regular FU . We will follow.      Yennifer Hand

## 2018-10-30 ENCOUNTER — APPOINTMENT (OUTPATIENT)
Dept: GENERAL RADIOLOGY | Facility: HOSPITAL | Age: 68
DRG: 242 | End: 2018-10-30
Attending: INTERNAL MEDICINE
Payer: MEDICARE

## 2018-10-30 ENCOUNTER — APPOINTMENT (OUTPATIENT)
Dept: INTERVENTIONAL RADIOLOGY/VASCULAR | Facility: HOSPITAL | Age: 68
DRG: 242 | End: 2018-10-30
Attending: INTERNAL MEDICINE
Payer: MEDICARE

## 2018-10-30 PROCEDURE — 71045 X-RAY EXAM CHEST 1 VIEW: CPT | Performed by: INTERNAL MEDICINE

## 2018-10-30 PROCEDURE — B51N1ZZ FLUOROSCOPY OF LEFT UPPER EXTREMITY VEINS USING LOW OSMOLAR CONTRAST: ICD-10-PCS | Performed by: INTERNAL MEDICINE

## 2018-10-30 PROCEDURE — 02H63JZ INSERTION OF PACEMAKER LEAD INTO RIGHT ATRIUM, PERCUTANEOUS APPROACH: ICD-10-PCS | Performed by: INTERNAL MEDICINE

## 2018-10-30 PROCEDURE — 99232 SBSQ HOSP IP/OBS MODERATE 35: CPT | Performed by: INTERNAL MEDICINE

## 2018-10-30 PROCEDURE — 02HK3JZ INSERTION OF PACEMAKER LEAD INTO RIGHT VENTRICLE, PERCUTANEOUS APPROACH: ICD-10-PCS | Performed by: INTERNAL MEDICINE

## 2018-10-30 PROCEDURE — 99233 SBSQ HOSP IP/OBS HIGH 50: CPT | Performed by: NURSE PRACTITIONER

## 2018-10-30 PROCEDURE — 0JH636Z INSERTION OF PACEMAKER, DUAL CHAMBER INTO CHEST SUBCUTANEOUS TISSUE AND FASCIA, PERCUTANEOUS APPROACH: ICD-10-PCS | Performed by: INTERNAL MEDICINE

## 2018-10-30 RX ORDER — LIDOCAINE HYDROCHLORIDE AND EPINEPHRINE 10; 10 MG/ML; UG/ML
INJECTION, SOLUTION INFILTRATION; PERINEURAL
Status: COMPLETED
Start: 2018-10-30 | End: 2018-10-30

## 2018-10-30 RX ORDER — DIPHENHYDRAMINE HYDROCHLORIDE 50 MG/ML
INJECTION INTRAMUSCULAR; INTRAVENOUS
Status: COMPLETED
Start: 2018-10-30 | End: 2018-10-30

## 2018-10-30 RX ORDER — AMOXICILLIN 500 MG/1
500 CAPSULE ORAL 3 TIMES DAILY
Status: DISCONTINUED | OUTPATIENT
Start: 2018-10-30 | End: 2018-10-31

## 2018-10-30 RX ORDER — BACITRACIN 50000 [USP'U]/1
INJECTION, POWDER, LYOPHILIZED, FOR SOLUTION INTRAMUSCULAR
Status: COMPLETED
Start: 2018-10-30 | End: 2018-10-30

## 2018-10-30 RX ORDER — CEFAZOLIN SODIUM/WATER 2 G/20 ML
2 SYRINGE (ML) INTRAVENOUS EVERY 8 HOURS
Status: COMPLETED | OUTPATIENT
Start: 2018-10-30 | End: 2018-10-31

## 2018-10-30 RX ORDER — CEFAZOLIN SODIUM/WATER 2 G/20 ML
SYRINGE (ML) INTRAVENOUS
Status: COMPLETED
Start: 2018-10-30 | End: 2018-10-30

## 2018-10-30 RX ORDER — MIDAZOLAM HYDROCHLORIDE 1 MG/ML
INJECTION INTRAMUSCULAR; INTRAVENOUS
Status: COMPLETED
Start: 2018-10-30 | End: 2018-10-30

## 2018-10-30 RX ORDER — SODIUM CHLORIDE 9 MG/ML
INJECTION, SOLUTION INTRAVENOUS
Status: COMPLETED
Start: 2018-10-30 | End: 2018-10-30

## 2018-10-30 NOTE — PROGRESS NOTES
[facility]    Progress Note    Te Bauer Patient Status:  Inpatient    1950 MRN [id number]   Location [facility] 2W/SW Attending Dang Guajardo MD   Hosp Day # 6 PCP Theresa Matta DO     Subjective:  Feels okay Levemir : 24 daily  - novolog : 10  along with medium  dose CF with meals  - Accuchecks AC and HS  - hypoglycemia protocol  - Diabetes diet    Discussed importance of compliance with medication and regular FU .      Discharge planning  Spoke with the Diabet

## 2018-10-30 NOTE — PHYSICAL THERAPY NOTE
9: 00am: Attempted to see patient for PT but pt is off the floor for pacemaker placement, will f/u for PT later or tomorrow as appropriate. RN is aware.  Thank You.    10:50am: Attempted to see patient for PT again but per RN, pt is on vital checks until 1:

## 2018-10-30 NOTE — INTERVAL H&P NOTE
Pre-op Diagnosis: * No surgery found *    The above referenced H&P was reviewed by Jeni Coughlin MD on 10/30/2018, the patient was examined and no significant changes have occurred in the patient's condition since the H&P was performed.   I discussed with

## 2018-10-30 NOTE — DIABETES ED
Talita Morel, , for patient to discuss prescription assistance at discharge. Patient likely does not have prescriptions covered by insurance plans. She will inform RN during rounds today.   Contacted Dr. Suyapa Tamez to inform her of patient i

## 2018-10-30 NOTE — PLAN OF CARE
Problem: Patient Centered Care  Goal: Patient preferences are identified and integrated in the patient's plan of care  Interventions:  - What would you like us to know as we care for you? People call me Román for short.    - Provide timely, complete, and ac for bleeding, hypotension and signs of decreased cardiac output  - Evaluate effectiveness of vasoactive medications to optimize hemodynamic stability  - Monitor arterial and/or venous puncture sites for bleeding and/or hematoma  - Assess quality of pulses, appropriate  - Advance diet as tolerated, if ordered  - Obtain nutritional consult as needed  - Evaluate fluid balance  Outcome: Progressing    Goal: Maintains adequate nutritional intake (undernourished)  INTERVENTIONS:  - Monitor percentage of each meal with patient/family  Outcome: Progressing    Goal: Maintain proper alignment of affected body part  INTERVENTIONS:  - Support and protect limb and body alignment per provider's orders  - Instruct and reinforce with patient and family use of appropriate ass response  - Implement non-pharmacological measures as appropriate and evaluate response  - Consider cultural and social influences on pain and pain management  - Manage/alleviate anxiety  - Utilize distraction and/or relaxation techniques  - Monitor for op

## 2018-10-30 NOTE — PROGRESS NOTES
Chuck Pressley  [id number]  10/30/2018    Post procedure/ recovery hand-off report given to Corrine Najera RN. Patient's vital signs stable, access site dry and intact, no signs and symptoms of bleeding/ hematoma.     Arias Sigala RN

## 2018-10-30 NOTE — CARDIAC REHAB
Cardiac Rehab Phase I    Activity:  Distance   Assistance needed   Patient tolerated activity . Education:  Handouts provided and reviewed: [address] Diet: Healthy Cardiac diet reviewed.     Disease Process: Disease process rev

## 2018-10-30 NOTE — PROGRESS NOTES
[place] [facility]    Progress Note    Carmine Max Patient Status:  Inpatient    1950 MRN [id number]   Location [facility] 3W/SW Attending Scotty Muro MD   [facility] Day # 6 PCP Frank Garibay DO        Subjective:     Constitu pending, pacemaker check tomorrow and possible d/c   PT/ot eval pending      >35 mins spent, more than 50% of the time was spent in counseling and/or coordination of care related to plan of care, treatment plan, coordinating with nursing and consultants.

## 2018-10-30 NOTE — DIABETES ED
Met with patient to teach vial/syringe technique for insulin injections. He declined education at this time stating \"i'm beat\". Discussed with RN and encouraged her to teach vial/syringe technique. Will revisit patient on 10/31.

## 2018-10-30 NOTE — OPERATIVE REPORT
ELECTROPHYSIOLOGY SERVICE OP REPORT    PROCEDURE: implant of dual chamber PPM    OPERATION PERFORMED:    1. Implantation of [place] Bra[place]io dual chamber PPM, serial number [id number] at the left infraclavicular site.     2. Implantation of transvenous leads:  Atrial The wire was advanced to the IVC under fluoroscopic guidance. A total of 2 wires were placed. RIGHT VENTRICULAR LEAD:  A 7 Tajik peel away sheath was brought to the field and placed  into the venous system via over the wire technique.  The right ventr 4-0 monocryl. Steri-Strips were applied to the incision followed by a Telfa and Tegaderm  Dressing.     MEASURED DEVICE DATA:  Atrial lead  sensin.4 mV  impedance: 410 Ohms  Threshold: 1.0 Volts at 0.4 ms    RV lead  sensin.5 mV  pacing impedance:

## 2018-10-30 NOTE — PROGRESS NOTES
[facility] Pharmacy Note:  Antibiotic Dose Adjustment    amoxacillin  875 mg PO q12h was ordered for hPu Almeida by AMARI Johnson. Body mass index is 23.23 kg/m².   Wt Readings from Last 6 Encounters:  10/30/18 : 69.3 kg (152 lb 12.8 oz)  08/18/17 : 7

## 2018-10-31 ENCOUNTER — APPOINTMENT (OUTPATIENT)
Dept: GENERAL RADIOLOGY | Facility: HOSPITAL | Age: 68
DRG: 242 | End: 2018-10-31
Attending: INTERNAL MEDICINE
Payer: MEDICARE

## 2018-10-31 VITALS
HEIGHT: 68 IN | RESPIRATION RATE: 16 BRPM | WEIGHT: 147.5 LBS | SYSTOLIC BLOOD PRESSURE: 150 MMHG | BODY MASS INDEX: 22.35 KG/M2 | OXYGEN SATURATION: 97 % | HEART RATE: 72 BPM | DIASTOLIC BLOOD PRESSURE: 73 MMHG | TEMPERATURE: 98 F

## 2018-10-31 PROCEDURE — 71046 X-RAY EXAM CHEST 2 VIEWS: CPT | Performed by: INTERNAL MEDICINE

## 2018-10-31 PROCEDURE — 99239 HOSP IP/OBS DSCHRG MGMT >30: CPT | Performed by: HOSPITALIST

## 2018-10-31 PROCEDURE — 99232 SBSQ HOSP IP/OBS MODERATE 35: CPT | Performed by: INTERNAL MEDICINE

## 2018-10-31 RX ORDER — ALFUZOSIN HYDROCHLORIDE 10 MG/1
10 TABLET, EXTENDED RELEASE ORAL
Qty: 30 TABLET | Refills: 0 | Status: SHIPPED | OUTPATIENT
Start: 2018-10-31 | End: 2018-11-30

## 2018-10-31 RX ORDER — HYDRALAZINE HYDROCHLORIDE 50 MG/1
50 TABLET, FILM COATED ORAL EVERY 8 HOURS SCHEDULED
Qty: 90 TABLET | Refills: 1 | Status: SHIPPED | OUTPATIENT
Start: 2018-10-31 | End: 2018-11-20

## 2018-10-31 RX ORDER — AMOXICILLIN 500 MG/1
500 CAPSULE ORAL 3 TIMES DAILY
Qty: 18 CAPSULE | Refills: 0 | Status: SHIPPED | OUTPATIENT
Start: 2018-10-31 | End: 2018-11-06

## 2018-10-31 RX ORDER — ASPIRIN 81 MG/1
81 TABLET ORAL DAILY
Status: DISCONTINUED | OUTPATIENT
Start: 2018-11-01 | End: 2018-10-31

## 2018-10-31 RX ORDER — ATORVASTATIN CALCIUM 20 MG/1
20 TABLET, FILM COATED ORAL NIGHTLY
Qty: 90 TABLET | Refills: 3 | Status: SHIPPED | OUTPATIENT
Start: 2018-10-31 | End: 2019-07-26

## 2018-10-31 RX ORDER — FUROSEMIDE 40 MG/1
40 TABLET ORAL DAILY
Qty: 30 TABLET | Refills: 1 | Status: SHIPPED | OUTPATIENT
Start: 2018-11-01 | End: 2018-11-20

## 2018-10-31 RX ORDER — ALFUZOSIN HYDROCHLORIDE 10 MG/1
10 TABLET, EXTENDED RELEASE ORAL
Status: DISCONTINUED | OUTPATIENT
Start: 2018-10-31 | End: 2018-10-31

## 2018-10-31 RX ORDER — ROSUVASTATIN CALCIUM 20 MG/1
40 TABLET, COATED ORAL NIGHTLY
Status: DISCONTINUED | OUTPATIENT
Start: 2018-10-31 | End: 2018-10-31

## 2018-10-31 RX ORDER — HYDRALAZINE HYDROCHLORIDE 50 MG/1
50 TABLET, FILM COATED ORAL EVERY 8 HOURS SCHEDULED
Status: DISCONTINUED | OUTPATIENT
Start: 2018-10-31 | End: 2018-10-31

## 2018-10-31 RX ORDER — BLOOD-GLUCOSE METER
1 EACH MISCELLANEOUS
Qty: 1 KIT | Refills: 0 | Status: SHIPPED | OUTPATIENT
Start: 2018-10-31

## 2018-10-31 RX ORDER — AMLODIPINE BESYLATE 10 MG/1
10 TABLET ORAL DAILY
Qty: 30 TABLET | Refills: 1 | Status: SHIPPED | OUTPATIENT
Start: 2018-10-31 | End: 2018-11-20

## 2018-10-31 NOTE — HOME CARE LIAISON
Met with patientand his wife at the bedside. Both are agreeable to [facility]. Brochure and liaison's card provided with contact information. All questions addressed and answered.

## 2018-10-31 NOTE — CARDIAC REHAB
Patient is not interested in any heart failure education as well as any further follow up with cardiac rehab.

## 2018-10-31 NOTE — PROGRESS NOTES
Misc. Note    CV surgery consulted for left carotid stenosis >70% on recent U/S done 10/25. Pt. due for discharge today per consulting Hospitalist APN. Pt. seen with his wife at bedside. Pt. Without complaints.  Wife stated her  did not have a syncop

## 2018-10-31 NOTE — CM/SW NOTE
10/31/18 1000   CM/SW Screening   Patient's Mental Status Alert;Oriented   Patient's [address]   Number of Levels in Home 2   Patient lives with Spouse   Patient Status Prior to Admission   Independent with ADLs and Mobility No   Pt. requir

## 2018-10-31 NOTE — CM/SW NOTE
Sw received additional [facility] order for PT. SW contacted [employer] [employer] [phone] and sent orders. Social work/case management to remain available for support and/or discharge planning.     Sosa Velez, [id number] [address]

## 2018-10-31 NOTE — PROGRESS NOTES
[facility]    Progress Note    Reena Estrada Patient Status:  Inpatient    1950 MRN [id number]   Location [facility] 2W/SW Attending David Vazquez MD   Hosp Day # 7 PCP China Santizo DO     Subjective:  Feels okay, Hy 24-->20 daily  - novolog : 10-->6  along with medium  dose CF with meals  - Accuchecks AC and HS  - hypoglycemia protocol  - Diabetes diet    Discussed importance of compliance with medication and regular FU .      Discharge plan:   He will need relion bran

## 2018-10-31 NOTE — PROGRESS NOTES
[facility]    Progress Note    Isidra Blanco Patient Status:  Inpatient    1950 MRN [id number]   Location [facility] 3W/SW Attending Mayuri Thomas MD   Hosp Day # 7 PCP Vamsi Rodas DO         Assessment and Plan:   Co and dry with bandage in place and no hematoma                Scheduled Meds:    • Insulin Aspart Pen  6 Units Subcutaneous TID CC   • insulin detemir  20 Units Subcutaneous Daily   • Alfuzosin HCl ER  10 mg Oral Daily with breakfast   • hydrALAzine HCl  50

## 2018-10-31 NOTE — DIABETES ED
[facility] - [facility]    Diabetes Education  Note    Willie Bryan Patient Status:  Inpatient   1950 MRN [id number]  Location [facility] 3W/SW Attending Rodolfo Ortega MD  Hosp Day # 7 PCP Mercedes Slater DO    Reason for Visit: Mike Hilton recommended.     Maggy Campbell, KJ  Inpatient Diabetes Educator  10/31/2018  2:14 PM

## 2018-10-31 NOTE — PROGRESS NOTES
[facility]    Cardiology Brief Post Procedure   Progress Note    Primary Cardiologist: Dr. Mesha Gooden  EP:  Dr. Angela Kehr  Subjective:   Subjective:  59year old male who presented with weakness and near syncope found to be in CHB.   Now s/p PPM. simvastatin  - LDL ~ 300  - CK on admit ~ 3000,  ALT 62  - added onto am labs today - DC home with lipitor 20 if labs ok - titrate up as outpt     CAD with hx of CABG 2011 (LIMA to LAD, SVGs to R PDA, D1, OM/ramus), stable  - continue asa, statin  - add BB

## 2018-10-31 NOTE — DISCHARGE SUMMARY
[place] [facility] - [facility]    Discharge Summary    Marily Butler Patient Status:  Inpatient    1950 MRN [id number]   Location [facility] 3W/SW Attending Staci Mathew MD   [facility] Day # 7 PCP Bijan Soriano DO     Date of Admission: 10/24/ management. CBC showed white blood cell count of 13.3. Chemistry showed BUN and creatinine of 22 and 1.91. The patient had a GFR of 35. His baseline GFR is normal.  He had CPK of 3100 and troponin 0.42.   EKG showed right bundle branch block with atrial [address]  [place] [address]  [phone]             Nathalia Jones MD On 11/7/2018.     Specialties:  SURGERY, CARDIOVASCULAR, Surgery, Thoracic  Why:  @ 11:00AM  Contact information:  [address] [facility] [phone] Device by Does not apply route 4 (four) times daily before meals and nightly.    Quantity:  1 kit  Refills:  0     furosemide 40 MG Tabs  Commonly known as:  LASIX  What changed:    · medication strength  · how much to take      Take 1 tablet (40 mg total) meal and at bedtime and record results to take to Dr. Garo Capellan office. Follow-up in clinic for incision check in 7-10 days. Arm restrictions for 3 months, with arm immobilizer sling at night for 1 month.       1. c Call the [address] [place] [address] office at [phone] should not drive until your cardiologist gives you permission. · Plan on resting and relaxing tonight and tomorrow. · Do not lift the affected arm above the level of the shoulder for the first 4 weeks.  A sling may be worn  for this purpose, we encourag Care Provider at your next visit to obtain a referral.  If you wish to make an appointment at [facility] 1, call [phone].           Discharge References/Attachments    Insulin, Using and Injecting (English)         Lobo

## 2018-10-31 NOTE — PHYSICAL THERAPY NOTE
PHYSICAL THERAPY EVALUATION - INPATIENT     Room Number: 342/342-A  Evaluation Date: 10/31/2018  Type of Evaluation: Initial   Physician Order: PT Eval and Treat    Presenting Problem: (Hyperglycemia, Fall,S/P pacemaker placement on 10/30)  Reason for The transfers, amb and stairs ability, which are below the patient's pre-admission status. Patient will benefit from continued IP PT services to address these deficits in preparation for discharge.  Recommend 24 hour supervision/assist upon DC from the hosp essential hypertension        Past Surgical History  Past Surgical History:   Procedure Laterality Date   • CABG  2010    per [employer]: quadrupal bypass   • CARDIAC PACEMAKER PLACEMENT  10/30/2018    [facility]   • COLONOSCOPY & POLYPECTOMY  05/11/2011    per Moving from lying on back to sitting on the side of the bed?: None   How much help from another person does the patient currently need. ..   -   Moving to and from a bed to a chair (including a wheelchair)?: A Little   -   Need to walk in hospital room?:

## 2018-10-31 NOTE — TRANSITION NOTE
Primary Cardiologist: Dr. Lamin Dickens  EP:  Dr. Jimena Burnette  Subjective:   Subjective:  59year old male who presented with weakness and near syncope found to be in CHB. Now s/p PPM.        Constitutional: Negative. Respiratory: Negative.     Cardiovascular: Ne 300  - CK on admit ~ 3000,  ALT 62  - added onto am labs today - DC home with lipitor 20 if labs ok - titrate up as outpt     CAD with hx of CABG 2011 (LIMA to LAD, SVGs to R PDA, D1, OM/ramus), stable  - continue asa, statin  - add BB as outpt     Plan  C 16 Units into the skin 2 (two) times daily before meals.              METFORMIN HCL 1000 MG Oral Tab  TAKE 1 TABLET BY MOUTH TWICE A DAY WITH MEALS                    Results:      Lab Results  Component Value Date   WBC 7.0 08/28/2018   HGB 13.5 08/28/2018

## 2018-11-01 ENCOUNTER — PATIENT OUTREACH (OUTPATIENT)
Dept: CASE MANAGEMENT | Age: 68
End: 2018-11-01

## 2018-11-01 ENCOUNTER — TELEPHONE (OUTPATIENT)
Dept: CARDIOLOGY UNIT | Facility: HOSPITAL | Age: 68
End: 2018-11-01

## 2018-11-01 DIAGNOSIS — Z02.9 ENCOUNTERS FOR ADMINISTRATIVE PURPOSES: ICD-10-CM

## 2018-11-01 NOTE — PROGRESS NOTES
Initial Post Discharge Follow Up   Discharge Date: 10/31/18  Contact Date: 11/1/2018    Consent Verification:  Assessment Completed With: Patient  HIPAA Verified?   Yes    Discharge Dx:   CHF    General:   • How have you been since your discharge from the Oral Tab Take 1 tablet (50 mg total) by mouth every 8 (eight) hours. Disp: 90 tablet Rfl: 1   AmLODIPine Besylate 10 MG Oral Tab Take 1 tablet (10 mg total) by mouth daily.  Disp: 30 tablet Rfl: 1   atorvastatin 20 MG Oral Tab Take 1 tablet (20 mg total) by diagnosis weighing yourself is very important  How often are you weighing yourself?not at all   Is there any reason you are unable to weigh yourself daily? I didn't know I had too  Were you told about any fluid restrictions?  No  Have you noticed any short Reviewed upcoming Specialist Appt with patient     Yes    Overall Rating: How would you rate the care you received while in the hospital?  good      Interventions by NCM: Call made to pt for TCM outreach. Pt declined med review.  Denies any CP, palpitatio

## 2018-11-01 NOTE — PROGRESS NOTES
TCM OUTREACH    Call made to attempt to reach patient for TCM follow up. Spoke to pts wife Samuel Lauren and pt scheduled for TCM f/u  On 11/6/18. Wife states pt is sound asleep and requested call back to complete outreach w pt at a later time.      Book by date:

## 2018-11-06 ENCOUNTER — OFFICE VISIT (OUTPATIENT)
Dept: FAMILY MEDICINE CLINIC | Facility: CLINIC | Age: 68
End: 2018-11-06
Payer: MEDICARE

## 2018-11-06 ENCOUNTER — TELEPHONE (OUTPATIENT)
Dept: FAMILY MEDICINE CLINIC | Facility: CLINIC | Age: 68
End: 2018-11-06

## 2018-11-06 VITALS
TEMPERATURE: 98 F | HEART RATE: 72 BPM | HEIGHT: 68 IN | WEIGHT: 151 LBS | SYSTOLIC BLOOD PRESSURE: 143 MMHG | OXYGEN SATURATION: 98 % | BODY MASS INDEX: 22.88 KG/M2 | DIASTOLIC BLOOD PRESSURE: 73 MMHG

## 2018-11-06 DIAGNOSIS — I25.10 CORONARY ARTERY DISEASE INVOLVING NATIVE CORONARY ARTERY OF NATIVE HEART WITHOUT ANGINA PECTORIS: ICD-10-CM

## 2018-11-06 DIAGNOSIS — I50.9 ACUTE ON CHRONIC CONGESTIVE HEART FAILURE, UNSPECIFIED HEART FAILURE TYPE (HCC): ICD-10-CM

## 2018-11-06 DIAGNOSIS — E11.8 UNCONTROLLED DIABETES MELLITUS WITH COMPLICATIONS (HCC): Primary | ICD-10-CM

## 2018-11-06 DIAGNOSIS — E11.9 TYPE 2 DIABETES MELLITUS WITHOUT COMPLICATION, WITHOUT LONG-TERM CURRENT USE OF INSULIN (HCC): ICD-10-CM

## 2018-11-06 DIAGNOSIS — E11.65 UNCONTROLLED DIABETES MELLITUS WITH COMPLICATIONS (HCC): Primary | ICD-10-CM

## 2018-11-06 PROBLEM — I50.812 CHRONIC RIGHT-SIDED HEART FAILURE (HCC): Status: ACTIVE | Noted: 2018-11-06

## 2018-11-06 PROCEDURE — 99214 OFFICE O/P EST MOD 30 MIN: CPT | Performed by: FAMILY MEDICINE

## 2018-11-06 PROCEDURE — G0463 HOSPITAL OUTPT CLINIC VISIT: HCPCS | Performed by: FAMILY MEDICINE

## 2018-11-06 PROCEDURE — 1111F DSCHRG MED/CURRENT MED MERGE: CPT | Performed by: FAMILY MEDICINE

## 2018-11-06 NOTE — PROGRESS NOTES
HPI:    Patient ID: Willie Bryan is a 80year old male. HPI  Patient presents with:  Hospital F/U: for hyperglycemia on 10/24/18 at [facility], [address] at home due to hyperglycemia and heart block.  Had not been taking any medications Intradermal route  every day Disp:  Rfl:    Blood Glucose Monitoring Suppl (ACCU-CHEK DANNY PLUS) W/DEVICE Does not apply Kit test once a day as directed Disp:  Rfl:      Allergies:No Known Allergies   PHYSICAL EXAM:   Physical Exam   Constitutional: He ap

## 2018-11-06 NOTE — TELEPHONE ENCOUNTER
Krystin Jackson called in from [facility] to inform S that pt has several doctors appointments today. Pt's family is requesting to post [facility] until 11/8.

## 2018-11-07 ENCOUNTER — OFFICE VISIT (OUTPATIENT)
Dept: CARDIOLOGY CLINIC | Facility: HOSPITAL | Age: 68
End: 2018-11-07
Attending: INTERNAL MEDICINE
Payer: MEDICARE

## 2018-11-07 VITALS
SYSTOLIC BLOOD PRESSURE: 150 MMHG | HEART RATE: 74 BPM | OXYGEN SATURATION: 100 % | WEIGHT: 152 LBS | DIASTOLIC BLOOD PRESSURE: 63 MMHG | BODY MASS INDEX: 23 KG/M2

## 2018-11-07 DIAGNOSIS — I50.33 ACUTE ON CHRONIC DIASTOLIC CONGESTIVE HEART FAILURE (HCC): ICD-10-CM

## 2018-11-07 DIAGNOSIS — I50.9 HEART FAILURE, UNSPECIFIED (HCC): Primary | ICD-10-CM

## 2018-11-07 PROBLEM — Z95.0 CARDIAC PACEMAKER: Status: ACTIVE | Noted: 2018-11-07

## 2018-11-07 PROBLEM — I44.2 COMPLETE HEART BLOCK (HCC): Status: ACTIVE | Noted: 2018-11-07

## 2018-11-07 PROCEDURE — 36415 COLL VENOUS BLD VENIPUNCTURE: CPT | Performed by: CLINICAL NURSE SPECIALIST

## 2018-11-07 PROCEDURE — 99212 OFFICE O/P EST SF 10 MIN: CPT | Performed by: CLINICAL NURSE SPECIALIST

## 2018-11-07 PROCEDURE — 80048 BASIC METABOLIC PNL TOTAL CA: CPT | Performed by: CLINICAL NURSE SPECIALIST

## 2018-11-07 PROCEDURE — 99214 OFFICE O/P EST MOD 30 MIN: CPT | Performed by: CLINICAL NURSE SPECIALIST

## 2018-11-07 NOTE — PROGRESS NOTES
[address] Patient Status:  Outpatient    1950 MRN [id number]   Location [address] Marlon Israel is a 80year old male who presents to clinic for asses 4.6 11/07/2018 12:43 PM     11/07/2018 12:43 PM    CO2 24 11/07/2018 12:43 PM    GLU 65 (L) 11/07/2018 12:43 PM    CA 8.5 11/07/2018 12:43 PM    ALB 1.8 (L) 10/31/2018 06:31 AM    ALKPHO 121 (H) 10/31/2018 06:31 AM    BILT 0.5 10/31/2018 06:31 AM (VTI): 1.58cm^2. 3. Left atrium: The atrium was moderately dilated. Compared to the previous study these findings are new. Compared to  2016 study, mild aortic stenosis is present.       Education:  Patient instructed at length regarding clinic procedures to read nutrition labels for sodium content.      Appointment with AMARI Peterson 11/30    Return to clinic 11/20          I spent greater than 60 minutes with this patient providing counseling, coordination of care and education related specifically to heart f

## 2018-11-07 NOTE — PATIENT INSTRUCTIONS
For the next three days, please increase furosemide (lasix) to 40 mg in the am and 20 mg (half tab) in the afternoon and then resume 40 mg daily    Begin tracking daily weights. Call with weight gain of 3 lbs overnight or concerning symptoms.    [phone]

## 2018-11-08 ENCOUNTER — TELEPHONE (OUTPATIENT)
Dept: FAMILY MEDICINE CLINIC | Facility: CLINIC | Age: 68
End: 2018-11-08

## 2018-11-08 ENCOUNTER — SNF/IP PROF CHARGE ONLY (OUTPATIENT)
Dept: FAMILY MEDICINE CLINIC | Facility: CLINIC | Age: 68
End: 2018-11-08

## 2018-11-08 DIAGNOSIS — I25.10 CORONARY ARTERY DISEASE INVOLVING NATIVE CORONARY ARTERY OF NATIVE HEART WITHOUT ANGINA PECTORIS: ICD-10-CM

## 2018-11-08 DIAGNOSIS — E11.8 UNCONTROLLED DIABETES MELLITUS WITH COMPLICATIONS (HCC): Primary | ICD-10-CM

## 2018-11-08 DIAGNOSIS — I48.20 CHRONIC ATRIAL FIBRILLATION (HCC): ICD-10-CM

## 2018-11-08 DIAGNOSIS — E11.69 TYPE 2 DIABETES MELLITUS WITH OTHER SPECIFIED COMPLICATION, WITHOUT LONG-TERM CURRENT USE OF INSULIN (HCC): ICD-10-CM

## 2018-11-08 DIAGNOSIS — I50.33 ACUTE ON CHRONIC DIASTOLIC CONGESTIVE HEART FAILURE (HCC): Primary | ICD-10-CM

## 2018-11-08 DIAGNOSIS — E11.65 UNCONTROLLED DIABETES MELLITUS WITH COMPLICATIONS (HCC): Primary | ICD-10-CM

## 2018-11-08 DIAGNOSIS — I50.33 ACUTE ON CHRONIC DIASTOLIC CONGESTIVE HEART FAILURE (HCC): ICD-10-CM

## 2018-11-08 PROCEDURE — G0180 MD CERTIFICATION HHA PATIENT: HCPCS | Performed by: FAMILY MEDICINE

## 2018-11-08 NOTE — TELEPHONE ENCOUNTER
Russ Oro called in from [facility] stating that pt was admitted into [facility] as of today for nursing. Russ Oro is requesting confirmation that S will sign off on orders.

## 2018-11-20 ENCOUNTER — OFFICE VISIT (OUTPATIENT)
Dept: CARDIOLOGY CLINIC | Facility: HOSPITAL | Age: 68
End: 2018-11-20
Attending: INTERNAL MEDICINE
Payer: MEDICARE

## 2018-11-20 VITALS
SYSTOLIC BLOOD PRESSURE: 152 MMHG | OXYGEN SATURATION: 100 % | DIASTOLIC BLOOD PRESSURE: 72 MMHG | BODY MASS INDEX: 24 KG/M2 | HEART RATE: 85 BPM | WEIGHT: 156 LBS

## 2018-11-20 DIAGNOSIS — I50.33 ACUTE ON CHRONIC DIASTOLIC CONGESTIVE HEART FAILURE (HCC): ICD-10-CM

## 2018-11-20 DIAGNOSIS — I50.9 HEART FAILURE, UNSPECIFIED (HCC): Primary | ICD-10-CM

## 2018-11-20 PROCEDURE — 99214 OFFICE O/P EST MOD 30 MIN: CPT | Performed by: CLINICAL NURSE SPECIALIST

## 2018-11-20 PROCEDURE — 36415 COLL VENOUS BLD VENIPUNCTURE: CPT | Performed by: CLINICAL NURSE SPECIALIST

## 2018-11-20 PROCEDURE — 99212 OFFICE O/P EST SF 10 MIN: CPT | Performed by: CLINICAL NURSE SPECIALIST

## 2018-11-20 PROCEDURE — 80048 BASIC METABOLIC PNL TOTAL CA: CPT | Performed by: CLINICAL NURSE SPECIALIST

## 2018-11-20 PROCEDURE — 83880 ASSAY OF NATRIURETIC PEPTIDE: CPT | Performed by: CLINICAL NURSE SPECIALIST

## 2018-11-20 RX ORDER — FUROSEMIDE 40 MG/1
40 TABLET ORAL SEE ADMIN INSTRUCTIONS
Qty: 45 TABLET | Refills: 1 | Status: SHIPPED | OUTPATIENT
Start: 2018-11-20 | End: 2018-12-20

## 2018-11-20 RX ORDER — AMLODIPINE BESYLATE 10 MG/1
10 TABLET ORAL DAILY
Qty: 90 TABLET | Refills: 0 | Status: SHIPPED | OUTPATIENT
Start: 2018-11-20 | End: 2019-05-15

## 2018-11-20 RX ORDER — HYDRALAZINE HYDROCHLORIDE 50 MG/1
50 TABLET, FILM COATED ORAL EVERY 8 HOURS SCHEDULED
Qty: 270 TABLET | Refills: 0 | Status: SHIPPED | OUTPATIENT
Start: 2018-11-20 | End: 2019-07-26

## 2018-11-20 NOTE — PATIENT INSTRUCTIONS
For the next three days, please increase furosemide (lasix) to 40 mg in the am and 20 mg (half tab) in the afternoon and then resume 40 mg daily    Continue tracking daily weights. Call with weight gain of 3 lbs overnight or concerning symptoms.    [phone]

## 2018-11-20 NOTE — PROGRESS NOTES
[address] Patient Status:  Outpatient    1950 MRN [id number]   Location [facility] DO Dr Mar Ching is a 80year old male who presents to clinic for asses 12:43 PM    CO2 24 11/07/2018 12:43 PM    GLU 65 (L) 11/07/2018 12:43 PM    CA 8.5 11/07/2018 12:43 PM    ALB 1.8 (L) 10/31/2018 06:31 AM    ALKPHO 121 (H) 10/31/2018 06:31 AM    BILT 0.5 10/31/2018 06:31 AM    TP 5.3 (L) 10/31/2018 06:31 AM    AST 28 10/3 area     (VTI): 1.58cm^2. 3. Left atrium: The atrium was moderately dilated. Compared to the previous study these findings are new. Compared to  2016 study, mild aortic stenosis is present.       Education:  Patient instructed at length regarding clinic p 11/30    Return to clinic as needed or as directed          I spent greater than 60 minutes with this patient providing counseling, coordination of care and education related specifically to heart failure.       AMARI Whiting

## 2018-11-28 NOTE — TELEPHONE ENCOUNTER
Patient's wife calling stating patient started on new meds while inpatient and requests refills for insulin and alfuzosin. Scripts pended. Please advise.     Diabetes Medications  Protocol Criteria:  · Appointment scheduled in the past 6 months or the next

## 2018-11-30 ENCOUNTER — OFFICE VISIT (OUTPATIENT)
Dept: CARDIOLOGY CLINIC | Facility: CLINIC | Age: 68
End: 2018-11-30
Payer: MEDICARE

## 2018-11-30 VITALS
DIASTOLIC BLOOD PRESSURE: 73 MMHG | WEIGHT: 152 LBS | HEART RATE: 93 BPM | SYSTOLIC BLOOD PRESSURE: 122 MMHG | TEMPERATURE: 98 F | RESPIRATION RATE: 16 BRPM | BODY MASS INDEX: 23.04 KG/M2 | HEIGHT: 68 IN

## 2018-11-30 DIAGNOSIS — R60.9 EDEMA, UNSPECIFIED TYPE: Primary | ICD-10-CM

## 2018-11-30 PROCEDURE — 99214 OFFICE O/P EST MOD 30 MIN: CPT | Performed by: NURSE PRACTITIONER

## 2018-11-30 PROCEDURE — G0463 HOSPITAL OUTPT CLINIC VISIT: HCPCS | Performed by: NURSE PRACTITIONER

## 2018-11-30 NOTE — PATIENT INSTRUCTIONS
1. Continue same medications  2. Follow up with Dr. Tonie Cardenas in 1 month as well as pacer appt  3.  Blood test in 1-2 weeks, weigh daily

## 2018-12-04 RX ORDER — ALFUZOSIN HYDROCHLORIDE 10 MG/1
10 TABLET, EXTENDED RELEASE ORAL
Qty: 90 TABLET | Refills: 0 | Status: SHIPPED | OUTPATIENT
Start: 2018-12-04 | End: 2019-03-22

## 2018-12-13 RX ORDER — GLIMEPIRIDE 4 MG/1
TABLET ORAL
Qty: 30 TABLET | Refills: 0 | OUTPATIENT
Start: 2018-12-13

## 2018-12-13 RX ORDER — FUROSEMIDE 20 MG/1
20 TABLET ORAL DAILY
Qty: 30 TABLET | Refills: 0 | OUTPATIENT
Start: 2018-12-13

## 2019-01-11 ENCOUNTER — APPOINTMENT (OUTPATIENT)
Dept: LAB | Age: 69
End: 2019-01-11
Attending: NURSE PRACTITIONER
Payer: MEDICARE

## 2019-01-11 DIAGNOSIS — R60.9 EDEMA, UNSPECIFIED TYPE: ICD-10-CM

## 2019-01-11 LAB
ANION GAP SERPL CALC-SCNC: 11 MMOL/L (ref 0–18)
BUN SERPL-MCNC: 27 MG/DL (ref 8–20)
BUN/CREAT SERPL: 15.2 (ref 10–20)
CALCIUM SERPL-MCNC: 8.9 MG/DL (ref 8.5–10.5)
CHLORIDE SERPL-SCNC: 106 MMOL/L (ref 95–110)
CO2 SERPL-SCNC: 23 MMOL/L (ref 22–32)
CREAT SERPL-MCNC: 1.78 MG/DL (ref 0.5–1.5)
GLUCOSE SERPL-MCNC: 117 MG/DL (ref 70–99)
OSMOLALITY UR CALC.SUM OF ELEC: 296 MOSM/KG (ref 275–295)
POTASSIUM SERPL-SCNC: 4.3 MMOL/L (ref 3.3–5.1)
SODIUM SERPL-SCNC: 140 MMOL/L (ref 136–144)

## 2019-01-11 PROCEDURE — 80048 BASIC METABOLIC PNL TOTAL CA: CPT

## 2019-01-11 PROCEDURE — 36415 COLL VENOUS BLD VENIPUNCTURE: CPT

## 2019-01-15 ENCOUNTER — OFFICE VISIT (OUTPATIENT)
Dept: CARDIOLOGY CLINIC | Facility: CLINIC | Age: 69
End: 2019-01-15
Payer: MEDICARE

## 2019-01-15 VITALS
RESPIRATION RATE: 16 BRPM | SYSTOLIC BLOOD PRESSURE: 152 MMHG | BODY MASS INDEX: 24.25 KG/M2 | DIASTOLIC BLOOD PRESSURE: 71 MMHG | HEART RATE: 91 BPM | TEMPERATURE: 98 F | WEIGHT: 160 LBS | HEIGHT: 68 IN

## 2019-01-15 DIAGNOSIS — I10 ESSENTIAL HYPERTENSION: ICD-10-CM

## 2019-01-15 DIAGNOSIS — I25.10 CORONARY ARTERY DISEASE INVOLVING NATIVE CORONARY ARTERY OF NATIVE HEART WITHOUT ANGINA PECTORIS: Primary | ICD-10-CM

## 2019-01-15 DIAGNOSIS — R60.0 LOCALIZED EDEMA: ICD-10-CM

## 2019-01-15 PROCEDURE — 99215 OFFICE O/P EST HI 40 MIN: CPT | Performed by: INTERNAL MEDICINE

## 2019-01-15 PROCEDURE — G0463 HOSPITAL OUTPT CLINIC VISIT: HCPCS | Performed by: INTERNAL MEDICINE

## 2019-01-15 NOTE — PATIENT INSTRUCTIONS
Get fasting cholesterol blood test  Schedule MRA of the carotids  Schedule Lexiscan stress test  Drink less coffee  Try walking more and keep your feet up when sitting  Avoid added salt  Schedule pacemaker follow-up

## 2019-01-15 NOTE — PROGRESS NOTES
Lucian Yun is a 80year old male. Patient presents with:  CHF: Both ankles swollen    HPI:   This is a 50-year-old gentleman with coronary disease known bypass in 2011 known diabetes hypertension who is status post recent pacemaker for complete heart blo Diagnosis Date   • ADHD (attention deficit hyperactivity disorder)    • Atherosclerosis of coronary artery    • Other and unspecified hyperlipidemia    • Type II or unspecified type diabetes mellitus without mention of complication, not stated as uncontr and will closely monitored. He does have some chronic leg swelling will try to avoid salt keep his feet up. If swelling persists may need to decrease amlodipine dose. There is mild aortic stenosis and left atrial enlargement patient needs to get the MRA i

## 2019-02-06 ENCOUNTER — APPOINTMENT (OUTPATIENT)
Dept: LAB | Age: 69
End: 2019-02-06
Attending: INTERNAL MEDICINE
Payer: MEDICARE

## 2019-02-06 DIAGNOSIS — I25.10 CORONARY ARTERY DISEASE INVOLVING NATIVE CORONARY ARTERY OF NATIVE HEART WITHOUT ANGINA PECTORIS: ICD-10-CM

## 2019-02-06 LAB
ALT SERPL-CCNC: 19 U/L (ref 17–63)
AST SERPL-CCNC: 20 U/L (ref 15–41)
CHOLEST SERPL-MCNC: 137 MG/DL (ref 110–200)
HDLC SERPL-MCNC: 50 MG/DL
LDLC SERPL CALC-MCNC: 73 MG/DL (ref 0–99)
NONHDLC SERPL-MCNC: 87 MG/DL
TRIGL SERPL-MCNC: 71 MG/DL (ref 1–149)

## 2019-02-06 PROCEDURE — 80061 LIPID PANEL: CPT

## 2019-02-06 PROCEDURE — 84460 ALANINE AMINO (ALT) (SGPT): CPT

## 2019-02-06 PROCEDURE — 84450 TRANSFERASE (AST) (SGOT): CPT

## 2019-02-06 PROCEDURE — 36415 COLL VENOUS BLD VENIPUNCTURE: CPT

## 2019-02-07 ENCOUNTER — TELEPHONE (OUTPATIENT)
Dept: CARDIOLOGY CLINIC | Facility: CLINIC | Age: 69
End: 2019-02-07

## 2019-02-07 NOTE — TELEPHONE ENCOUNTER
Detailed message left for Ladarius Nielsen (HIPAA verified) of APN RH's comments that Lipids are very much improved and to CPM. Advised that the AST & ALT are liver enzymes and that they are WNL as well. Number left to call with any questions.

## 2019-02-11 ENCOUNTER — TELEPHONE (OUTPATIENT)
Dept: CARDIOLOGY CLINIC | Facility: CLINIC | Age: 69
End: 2019-02-11

## 2019-02-11 RX ORDER — FUROSEMIDE 40 MG/1
40 TABLET ORAL DAILY
Qty: 90 TABLET | Refills: 1 | Status: ON HOLD | OUTPATIENT
Start: 2019-02-11 | End: 2019-06-22

## 2019-02-11 NOTE — TELEPHONE ENCOUNTER
Furosemide 40mg tabs, take 1 tab by mouth once daily, qty 30   ---   PSR unable to locate rx on med list    Current Outpatient Medications:

## 2019-02-11 NOTE — TELEPHONE ENCOUNTER
S/w wife, states pt is taking lasix still but currently out for a few days waiting on refill. Reviewed chart with RH, pt to be on lasix 40 mg daily.  Creatinine elevated but less than 30%, Verified medication dose, Meets protocol , refill order sent  Hype 01/11/2019    CO2 23 01/11/2019    GLOBULIN 3.3 10/24/2018    AGRATIO 1.2 07/05/2016    ANIONGAP 11 01/11/2019    [facility] 296 (H) 01/11/2019

## 2019-03-25 RX ORDER — ALFUZOSIN HYDROCHLORIDE 10 MG/1
TABLET, EXTENDED RELEASE ORAL
Qty: 90 TABLET | Refills: 1 | Status: SHIPPED | OUTPATIENT
Start: 2019-03-25 | End: 2019-07-26

## 2019-05-15 NOTE — TELEPHONE ENCOUNTER
Please review elevated creatinine and advise on refill request.    Hypertensive Medications  Protocol Criteria:  · Appointment scheduled with Cardiology in the past 12 months or in the next 3 months  · BMP or CMP in the past 12 months  · Potassium: 3.1 - 4

## 2019-05-29 RX ORDER — AMLODIPINE BESYLATE 10 MG/1
TABLET ORAL
Qty: 90 TABLET | Refills: 1 | Status: SHIPPED | OUTPATIENT
Start: 2019-05-29 | End: 2019-07-26

## 2019-06-14 ENCOUNTER — HOSPITAL ENCOUNTER (INPATIENT)
Facility: HOSPITAL | Age: 69
LOS: 8 days | Discharge: NURSING, REHAB OR LONG-TERM CARE | DRG: 239 | End: 2019-06-22
Attending: EMERGENCY MEDICINE | Admitting: HOSPITALIST
Payer: MEDICARE

## 2019-06-14 ENCOUNTER — APPOINTMENT (OUTPATIENT)
Dept: GENERAL RADIOLOGY | Facility: HOSPITAL | Age: 69
DRG: 239 | End: 2019-06-14
Attending: EMERGENCY MEDICINE
Payer: MEDICARE

## 2019-06-14 DIAGNOSIS — I96 GANGRENE OF RIGHT FOOT (HCC): ICD-10-CM

## 2019-06-14 DIAGNOSIS — I96 NECROTIC TOES (HCC): Primary | ICD-10-CM

## 2019-06-14 PROCEDURE — 99223 1ST HOSP IP/OBS HIGH 75: CPT | Performed by: HOSPITALIST

## 2019-06-14 PROCEDURE — 73630 X-RAY EXAM OF FOOT: CPT | Performed by: EMERGENCY MEDICINE

## 2019-06-14 RX ORDER — MORPHINE SULFATE 2 MG/ML
2 INJECTION, SOLUTION INTRAMUSCULAR; INTRAVENOUS EVERY 2 HOUR PRN
Status: DISCONTINUED | OUTPATIENT
Start: 2019-06-14 | End: 2019-06-22

## 2019-06-14 RX ORDER — HYDROCODONE BITARTRATE AND ACETAMINOPHEN 5; 325 MG/1; MG/1
1 TABLET ORAL EVERY 4 HOURS PRN
Status: DISCONTINUED | OUTPATIENT
Start: 2019-06-14 | End: 2019-06-22

## 2019-06-14 RX ORDER — ACETAMINOPHEN 325 MG/1
650 TABLET ORAL EVERY 4 HOURS PRN
Status: DISCONTINUED | OUTPATIENT
Start: 2019-06-14 | End: 2019-06-22

## 2019-06-14 RX ORDER — ONDANSETRON 2 MG/ML
4 INJECTION INTRAMUSCULAR; INTRAVENOUS EVERY 6 HOURS PRN
Status: DISCONTINUED | OUTPATIENT
Start: 2019-06-14 | End: 2019-06-22

## 2019-06-14 RX ORDER — ACETAMINOPHEN 325 MG/1
650 TABLET ORAL EVERY 6 HOURS PRN
Status: DISCONTINUED | OUTPATIENT
Start: 2019-06-14 | End: 2019-06-22

## 2019-06-14 RX ORDER — DEXTROSE MONOHYDRATE 25 G/50ML
50 INJECTION, SOLUTION INTRAVENOUS AS NEEDED
Status: DISCONTINUED | OUTPATIENT
Start: 2019-06-14 | End: 2019-06-19

## 2019-06-14 RX ORDER — MORPHINE SULFATE 2 MG/ML
1 INJECTION, SOLUTION INTRAMUSCULAR; INTRAVENOUS EVERY 2 HOUR PRN
Status: DISCONTINUED | OUTPATIENT
Start: 2019-06-14 | End: 2019-06-22

## 2019-06-14 RX ORDER — HEPARIN SODIUM 5000 [USP'U]/ML
5000 INJECTION, SOLUTION INTRAVENOUS; SUBCUTANEOUS EVERY 12 HOURS SCHEDULED
Status: DISCONTINUED | OUTPATIENT
Start: 2019-06-14 | End: 2019-06-19 | Stop reason: ALTCHOICE

## 2019-06-14 RX ORDER — HYDROCODONE BITARTRATE AND ACETAMINOPHEN 5; 325 MG/1; MG/1
2 TABLET ORAL EVERY 4 HOURS PRN
Status: DISCONTINUED | OUTPATIENT
Start: 2019-06-14 | End: 2019-06-22

## 2019-06-14 RX ORDER — SODIUM CHLORIDE 0.9 % (FLUSH) 0.9 %
3 SYRINGE (ML) INJECTION AS NEEDED
Status: DISCONTINUED | OUTPATIENT
Start: 2019-06-14 | End: 2019-06-22

## 2019-06-14 RX ORDER — METOCLOPRAMIDE HYDROCHLORIDE 5 MG/ML
10 INJECTION INTRAMUSCULAR; INTRAVENOUS EVERY 8 HOURS PRN
Status: DISCONTINUED | OUTPATIENT
Start: 2019-06-14 | End: 2019-06-16

## 2019-06-14 RX ORDER — HYDROCODONE BITARTRATE AND ACETAMINOPHEN 5; 325 MG/1; MG/1
1 TABLET ORAL ONCE
Status: COMPLETED | OUTPATIENT
Start: 2019-06-14 | End: 2019-06-14

## 2019-06-14 RX ORDER — SODIUM CHLORIDE 9 MG/ML
INJECTION, SOLUTION INTRAVENOUS CONTINUOUS
Status: DISCONTINUED | OUTPATIENT
Start: 2019-06-14 | End: 2019-06-18

## 2019-06-14 RX ORDER — MORPHINE SULFATE 4 MG/ML
4 INJECTION, SOLUTION INTRAMUSCULAR; INTRAVENOUS EVERY 2 HOUR PRN
Status: DISCONTINUED | OUTPATIENT
Start: 2019-06-14 | End: 2019-06-22

## 2019-06-14 NOTE — ED INITIAL ASSESSMENT (HPI)
Pt with hx diabetes arrived with complaint of foot pain and wound to toes of right foot for 3 weeks. The right 3rd, 4th and 5th toes are completely necrotic with partial necrosis to the right second toe.  There is malodorous drainage coming form the area of

## 2019-06-14 NOTE — ED PROVIDER NOTES
Patient Seen in: [facility] AND [facility] Emergency Department    History   Patient presents with: Foot Pain    Stated Complaint: R foot pain/necrosis    HPI    Patient is a 43-year-old man that states he developed pain in his right foot about 3 weeks ago.   Si normal.   Left Ear: External ear normal.   Nose: Nose normal.   Mouth/Throat: Oropharynx is clear and moist.   Eyes: Conjunctivae and EOM are normal. Pupils are equal, round, and reactive to light. Neck: Neck supple.    Cardiovascular: Normal rate, regula DIFFERENTIAL WITH PLATELET.   Procedure                               Abnormality         Status                     ---------                               -----------         ------                     CBC W/ DIFFERENTIAL[[id number]]          Abnormal

## 2019-06-15 ENCOUNTER — APPOINTMENT (OUTPATIENT)
Dept: ULTRASOUND IMAGING | Facility: HOSPITAL | Age: 69
DRG: 239 | End: 2019-06-15
Attending: PODIATRIST
Payer: MEDICARE

## 2019-06-15 ENCOUNTER — APPOINTMENT (OUTPATIENT)
Dept: NUCLEAR MEDICINE | Facility: HOSPITAL | Age: 69
DRG: 239 | End: 2019-06-15
Attending: HOSPITALIST
Payer: MEDICARE

## 2019-06-15 ENCOUNTER — APPOINTMENT (OUTPATIENT)
Dept: CV DIAGNOSTICS | Facility: HOSPITAL | Age: 69
DRG: 239 | End: 2019-06-15
Attending: HOSPITALIST
Payer: MEDICARE

## 2019-06-15 PROCEDURE — 78452 HT MUSCLE IMAGE SPECT MULT: CPT | Performed by: HOSPITALIST

## 2019-06-15 PROCEDURE — 99233 SBSQ HOSP IP/OBS HIGH 50: CPT | Performed by: HOSPITALIST

## 2019-06-15 PROCEDURE — 93017 CV STRESS TEST TRACING ONLY: CPT | Performed by: HOSPITALIST

## 2019-06-15 PROCEDURE — 93926 LOWER EXTREMITY STUDY: CPT | Performed by: PODIATRIST

## 2019-06-15 PROCEDURE — 99221 1ST HOSP IP/OBS SF/LOW 40: CPT | Performed by: PODIATRIST

## 2019-06-15 RX ORDER — ATORVASTATIN CALCIUM 20 MG/1
20 TABLET, FILM COATED ORAL NIGHTLY
Status: DISCONTINUED | OUTPATIENT
Start: 2019-06-15 | End: 2019-06-22

## 2019-06-15 RX ORDER — ASPIRIN 81 MG/1
81 TABLET ORAL DAILY
Status: DISCONTINUED | OUTPATIENT
Start: 2019-06-15 | End: 2019-06-22

## 2019-06-15 RX ORDER — ALFUZOSIN HYDROCHLORIDE 10 MG/1
10 TABLET, EXTENDED RELEASE ORAL
Status: DISCONTINUED | OUTPATIENT
Start: 2019-06-15 | End: 2019-06-22

## 2019-06-15 RX ORDER — 0.9 % SODIUM CHLORIDE 0.9 %
VIAL (ML) INJECTION
Status: COMPLETED
Start: 2019-06-15 | End: 2019-06-15

## 2019-06-15 NOTE — CONSULTS
[facility]    Report of Consultation    Osbaldo Stephenson Patient Status:  Inpatient    1950 MRN [id number]   Location [facility] 5SW/SE Attending Colin Stover MD   Hosp Day # 1 PCP Augustin Delgado DO     Date of Admission: injection 3 mL, 3 mL, Intravenous, PRN  •  Heparin Sodium (Porcine) 5000 UNIT/ML injection 5,000 Units, 5,000 Units, Subcutaneous, 2 times per day  •  acetaminophen (TYLENOL) tab 650 mg, 650 mg, Oral, Q6H PRN  •  ondansetron HCl (ZOFRAN) injection 4 mg, 4 either lower extremity  Neurologic: Pain sensation is diminished  Musculoskeletal: The patient is ambulatory he has gangrenous changes to the last 3 toes of his right foot.   Xr Foot, Complete (min 3 Views), Right (cpt=73630)    Result Date: 6/14/2019  CONC of the foot because of the gangrenous changes which are present. I also discussed with him that frequently this can lead to loss of limb. He understood.   [address] KRISTYN Trinidad   6/15/2019  7:48 AM

## 2019-06-15 NOTE — H&P
[address] Patient Status:  Inpatient    1950 MRN [id number]   Location [facility] 5SW/SE Attending Lianne Armendariz MD   Hosp Day # 1 PCP Yesica Roblero DO     Date:  6/15/2019  D Disp: 90 tablet Rfl: 1    ALFUZOSIN HCL ER 10 MG Oral Tablet 24 Hr TAKE 1 TABLET BY MOUTH ONCE DAILY IN THE MORNING WITH BREAKFAST Disp: 90 tablet Rfl: 1    furosemide 40 MG Oral Tab Take 1 tablet (40 mg total) by mouth daily.  Disp: 90 tablet Rfl: 1    Ins superficial femoral artery. Biphasic waveforms in the popliteal artery continuing inferiorly. The posterior tibial artery has a blunted biphasic waveform throughout its course.   The anterior tibial artery has a biphasic waveform with high velocities note

## 2019-06-15 NOTE — ED NOTES
Orders for admission, patient is aware of plan and ready to go upstairs.  Any questions, please call ED RN Maged Benavides  at extension [phone]

## 2019-06-15 NOTE — PROGRESS NOTES
[facility] Pharmacy Note:  Renal Adjustment for meropenem (MERREM)    Rajendra Costa is a 77year old male who has been prescribed meropenem (MERREM) 500 mg every 8 hrs. CrCl is estimated creatinine clearance is 28.9 mL/min (A) (based on SCr of 2.37 mg/dL (H)).  s

## 2019-06-15 NOTE — CONSULTS
[facility] ID CONSULT NOTE    Lucian Yun Patient Status:  Inpatient    1950 MRN [id number]   Location [facility] 5SW/SE Attending Ishmael Rivera MD   Hosp Day # 1 PCP Barbara Horn DO       Reason for C has quit smoking. He has never used smokeless tobacco. He reports that he does not drink alcohol or use drugs.     Allergies:  No Known Allergies    Medications:    Current Facility-Administered Medications:   •  Cefepime HCl (MAXIPIME) 1 g in sodium chlori No chest pain, chest pressure or chest discomfort  RESPIRATORY:  No shortness of breath, cough or sputum. GASTROINTESTINAL:  No anorexia, nausea, vomiting or diarrhea. No abdominal pain or blood. GENITOURINARY:  No Burning on urination.    NEUROLOGICAL: He noted his 3-5th toes progressively turned black with odor. Last few days he has had fevers and chills. No reports of trauma. On admission, febrile to 100.6. Wbc 22.2. BRIANNE. XR neg for osteomyelitis but with gas in plantar tissues. Podiatry evaluated pt.

## 2019-06-15 NOTE — PROGRESS NOTES
[facility]    Progress Note    Sedrick Solis Patient Status:  Inpatient    1950 MRN [id number]   Location [facility] 5SW/SE Attending Diana Ibanez MD   Hosp Day # 1 PCP Leonie Lay DO       Subjective:     C/o Rt Heparin subcu          Results:     Lab Results   Component Value Date    WBC 16.5 (H) 06/15/2019    HGB 7.8 (L) 06/15/2019    HCT 22.8 (L) 06/15/2019    .0 06/15/2019    CREATSERUM 2.37 (H) 06/15/2019    BUN 36 (H) 06/15/2019     06/15/2019

## 2019-06-15 NOTE — PROGRESS NOTES
I called Dr. Svetlana Zelaya to discuss the case with him and see who was on-call. He recommended canceling the arterial Doppler that I order and do a arterial duplex Doppler of that right leg.   I reordered it in that fashion for the right lower extremity for

## 2019-06-16 PROCEDURE — 99223 1ST HOSP IP/OBS HIGH 75: CPT | Performed by: INTERNAL MEDICINE

## 2019-06-16 PROCEDURE — 99233 SBSQ HOSP IP/OBS HIGH 50: CPT | Performed by: HOSPITALIST

## 2019-06-16 RX ORDER — BISACODYL 10 MG
10 SUPPOSITORY, RECTAL RECTAL 4 TIMES DAILY PRN
Status: DISCONTINUED | OUTPATIENT
Start: 2019-06-16 | End: 2019-06-22

## 2019-06-16 RX ORDER — METOCLOPRAMIDE HYDROCHLORIDE 5 MG/ML
5 INJECTION INTRAMUSCULAR; INTRAVENOUS EVERY 8 HOURS PRN
Status: DISCONTINUED | OUTPATIENT
Start: 2019-06-16 | End: 2019-06-22

## 2019-06-16 RX ORDER — LACTULOSE 10 G/15ML
20 SOLUTION ORAL 2 TIMES DAILY PRN
Status: DISCONTINUED | OUTPATIENT
Start: 2019-06-16 | End: 2019-06-22

## 2019-06-16 NOTE — PROGRESS NOTES
[place] [facility]    Progress Note    Vitaliy Carolinas Patient Status:  Inpatient    1950 MRN [id number]   Location [facility] 5SW/SE Attending Yina Blackman MD   Hosp Day # 2 PCP Sadie Hewitt DO       Subjective:     C/o Rt evidence of reversible ischemia. EF 41%, global LV hypokinesis    Acute renal failure, on admission Cr~2.6  H/o Chronic kidney disease stage 3.   Baseline Cr~1.8  -Gentle hydration, monitor for now  -Renal consult, d/w Dr. Kalli Riojas    Diabetes mellitus type

## 2019-06-16 NOTE — PLAN OF CARE
Problem: Diabetes/Glucose Control  Goal: Glucose maintained within prescribed range  Description  INTERVENTIONS:  - Monitor Blood Glucose as ordered  - Assess for signs and symptoms of hyperglycemia and hypoglycemia  - Administer ordered medications to m devices as appropriate  - Consider OT/PT consult to assist with strengthening/mobility  - Encourage toileting schedule  Outcome: Progressing     Problem: SKIN/TISSUE INTEGRITY - ADULT  Goal: Skin integrity remains intact  Description  INTERVENTIONS  - Asse

## 2019-06-16 NOTE — PLAN OF CARE
Problem: Patient Centered Care  Goal: Patient preferences are identified and integrated in the patient's plan of care  Description  Interventions:  - What would you like us to know as we care for you?   - Provide timely, complete, and accurate informatio cultural and social influences on pain and pain management  - Manage/alleviate anxiety  - Utilize distraction and/or relaxation techniques  - Monitor for opioid side effects  - Notify MD/LIP if interventions unsuccessful or patient reports new pain  - Anti patient needs post-hospital services based on physician/LIP order or complex needs related to functional status, cognitive ability or social support system  Outcome: Progressing     Problem: SKIN/TISSUE INTEGRITY - ADULT  Goal: Skin integrity remains intac

## 2019-06-16 NOTE — PROGRESS NOTES
[facility] Pharmacy Note:  Renal Dose Adjustment for Metoclopramide (REGLAN)    Carmine Max has been prescribed Metoclopramide (REGLAN) 10 mg every 8 hours as needed for n/v.    Estimated Creatinine Clearance: 30.1 mL/min (A) (based on SCr of 2.27 mg/dL (H)).

## 2019-06-16 NOTE — CONSULTS
[facility]    PATIENT'S NAME: [email or web address], [place]   ATTENDING PHYSICIAN: Hailey Kramer MD   CONSULTING PHYSICIAN: Norberto Giang MD   PATIENT ACCOUNT#:   [id number]    LOCATION:  [address] [place] [address] #:   [id number]       DATE OF BI test during this admission which revealed a fixed defect in the inferior wall, but no reversible changes and his left ventricular ejection fraction was 41%.      MEDICATIONS:  His current medications include Uroxatral, aspirin 81 mg daily, Lipitor, subcutan hematocrit 22.0, and iron levels were low. Blood cultures are negative so far. X-rays of the right foot shows soft tissue gas but no overt osteomyelitis. IMPRESSION:    1.    The patient is a 36-year-old male who appears to have at least chronic kidn therefore not repeat. Venofer for iron def. Continue I/Os, daily weights and serial chemistries. We will follow. Discussed the above with Dr. Toro No.      Dictated By Landon Tyson MD  d: 06/16/2019 11:23:01  t: 06/16/2019 12:41:22  [facility] [id number]

## 2019-06-17 ENCOUNTER — APPOINTMENT (OUTPATIENT)
Dept: CV DIAGNOSTICS | Facility: HOSPITAL | Age: 69
DRG: 239 | End: 2019-06-17
Attending: INTERNAL MEDICINE
Payer: MEDICARE

## 2019-06-17 ENCOUNTER — APPOINTMENT (OUTPATIENT)
Dept: INTERVENTIONAL RADIOLOGY/VASCULAR | Facility: HOSPITAL | Age: 69
DRG: 239 | End: 2019-06-17
Attending: CLINICAL NURSE SPECIALIST
Payer: MEDICARE

## 2019-06-17 PROCEDURE — 99233 SBSQ HOSP IP/OBS HIGH 50: CPT | Performed by: HOSPITALIST

## 2019-06-17 PROCEDURE — 93306 TTE W/DOPPLER COMPLETE: CPT | Performed by: INTERNAL MEDICINE

## 2019-06-17 PROCEDURE — 99233 SBSQ HOSP IP/OBS HIGH 50: CPT | Performed by: INTERNAL MEDICINE

## 2019-06-17 PROCEDURE — 047R3ZZ DILATION OF RIGHT POSTERIOR TIBIAL ARTERY, PERCUTANEOUS APPROACH: ICD-10-PCS | Performed by: RADIOLOGY

## 2019-06-17 PROCEDURE — B41D1ZZ FLUOROSCOPY OF AORTA AND BILATERAL LOWER EXTREMITY ARTERIES USING LOW OSMOLAR CONTRAST: ICD-10-PCS | Performed by: RADIOLOGY

## 2019-06-17 PROCEDURE — 99231 SBSQ HOSP IP/OBS SF/LOW 25: CPT | Performed by: PODIATRIST

## 2019-06-17 RX ORDER — HEPARIN SODIUM 1000 [USP'U]/ML
INJECTION, SOLUTION INTRAVENOUS; SUBCUTANEOUS
Status: COMPLETED
Start: 2019-06-17 | End: 2019-06-17

## 2019-06-17 RX ORDER — SODIUM CHLORIDE 9 MG/ML
INJECTION, SOLUTION INTRAVENOUS
Status: COMPLETED
Start: 2019-06-17 | End: 2019-06-17

## 2019-06-17 RX ORDER — LIDOCAINE HYDROCHLORIDE 20 MG/ML
INJECTION, SOLUTION EPIDURAL; INFILTRATION; INTRACAUDAL; PERINEURAL
Status: COMPLETED
Start: 2019-06-17 | End: 2019-06-17

## 2019-06-17 RX ORDER — MIDAZOLAM HYDROCHLORIDE 1 MG/ML
INJECTION INTRAMUSCULAR; INTRAVENOUS
Status: COMPLETED
Start: 2019-06-17 | End: 2019-06-17

## 2019-06-17 RX ORDER — NITROGLYCERIN 20 MG/100ML
INJECTION INTRAVENOUS
Status: COMPLETED
Start: 2019-06-17 | End: 2019-06-17

## 2019-06-17 NOTE — PROGRESS NOTES
[facility] ID PROGRESS NOTE    Mat Malling Patient Status:  Inpatient    1950 MRN [id number]   Location [facility] Attending Giancarlo Delgado MD   Hosp Day # 3 PCP Morteza Glover DO     Subjective:  Awake, plans for a meropenem  Cefepime      Patient is a 77year-old male with a history of uncontrolled T2DM, CHF, CAD (s/p CABG 2011), complete heart block (s/p PPM), carotid stenosis, HTN, HLD, CKD, former smoker, noncompliance     He presents to [address] [place] [address] ER  6/14 with R foot

## 2019-06-17 NOTE — PROGRESS NOTES
[facility] dosing service    Follow-up Pharmacokinetic Consult for Vancomycin Dosing     Isidra Blanco is a 77year old male who is being treated for r/o sepsis 2/2 cellulitis.    Patient is on day 4 of Vancomycin and add is currently receiving 1 gm IV Q

## 2019-06-17 NOTE — PROGRESS NOTES
[facility]    Progress Note    Shakir Bibb Patient Status:  Inpatient    1950 MRN [id number]   Location [facility] 5SW/SE Attending Carmelita Max MD   Hosp Day # 3 PCP Bassem Duvall DO     History:  Past Medical H Intravenous, Q12H  •  Normal Saline Flush 0.9 % injection 3 mL, 3 mL, Intravenous, PRN  •  Heparin Sodium (Porcine) 5000 UNIT/ML injection 5,000 Units, 5,000 Units, Subcutaneous, 2 times per day  •  acetaminophen (TYLENOL) tab 650 mg, 650 mg, Oral, Q6H PRN 6/14/2019  CONCLUSION:  1. No radiographic evidence of osteomyelitis. 2. Soft tissue gas noted.     Dictated by (CST): Maxx Mckenzie MD on 6/14/2019 at 19:03     Approved by (CST): Maxx Mckenzie MD on 6/14/2019 at 19:04             Labor

## 2019-06-17 NOTE — PRE-SEDATION ASSESSMENT
[place] [facility]  IR Pre-Procedure Sedation Assessment    History of snoring or sleep or apnea?    No    History of previous problems with anesthesia or sedation  No    Physical Findings:  Neck: nl ROM  CV: RRR  PULM: normal respiratory rate/effor

## 2019-06-17 NOTE — PLAN OF CARE
Patient alert and oriented  x4. Bed locked call light within reach, bed in lowest position. Vital signs are stable, denies pain, no signs of distress. Pt NPO going for an angiogram today. See by cardiologist will need an echo.   Per cardiologist if echo

## 2019-06-17 NOTE — PROGRESS NOTES
[facility]  Nephrology Daily Progress Note    Daisy Kowalski  [id number]  77year old      HPI:   Daisy Kowalski is a 77year old male. Awake and alert. Has some R foot pain. No CP or SOB.        ROS:     Constitutional:  Negative for decre Component Value Date    WBC 16.2 06/17/2019    HGB 7.4 06/17/2019    HCT 22.3 06/17/2019    .0 06/17/2019    CREATSERUM 2.06 06/17/2019    BUN 38 06/17/2019     06/17/2019    K 4.3 06/17/2019     06/17/2019    CO2 19.0 06/17/2019    GL mg, Rectal, QID PRN  •  lactulose (CHRONULAC) 10 GM/15ML solution 20 g, 20 g, Oral, BID PRN  •  Metoclopramide HCl (REGLAN) injection 5 mg, 5 mg, Intravenous, Q8H PRN  •  iron sucrose (VENOFER) IV Push 200 mg, 200 mg, Intravenous, Daily  •  Alfuzosin HCl E Allergies    Input/Output:    Intake/Output Summary (Last 24 hours) at 6/17/2019 1112  Last data filed at 6/17/2019 0404  Gross per 24 hour   Intake 930 ml   Output 750 ml   Net 180 ml          ASSESSMENT/PLAN:   Assessment   Patient Active Problem List:

## 2019-06-17 NOTE — CONSULTS
[facility]    Report of Consultation    Aydee Eneida Patient Status:  Inpatient    1950 MRN [id number]   Location [facility] 5SW/SE Attending Blake De La Cruz MD   Hosp Day # 3 PCP Janette Kam, DO     Date of Admissi disorder)    • Atherosclerosis of coronary artery    • Other and unspecified hyperlipidemia    • Type II or unspecified type diabetes mellitus without mention of complication, not stated as uncontrolled    • Unspecified essential hypertension        Past [facility] injection 4 mg 4 mg Intravenous Q6H PRN   acetaminophen (TYLENOL) tab 650 mg 650 mg Oral Q4H PRN   Or      HYDROcodone-acetaminophen (NORCO) 5-325 MG per tab 1 tablet 1 tablet Oral Q4H PRN   Or      HYDROcodone-acetaminophen (NORCO) 5-325 MG per t (ACCU-CHEK DANNY PLUS) W/DEVICE Does not apply Kit test once a day as directed   hydrALAzine HCl 50 MG Oral Tab Take 1 tablet (50 mg total) by mouth every 8 (eight) hours.        Allergies        No Known Allergies    Review of Systems:   Mildly leg swellin and oriented, normal affect. No motor or coordinational deficit. Skin:  Warm and dry.      Results:     Laboratory Data:        Lab Results   Component Value Date    WBC 16.2 (H) 06/17/2019    HGB 7.4 (L) 06/17/2019    HCT 22.3 (L) 06/17/2019    .0

## 2019-06-17 NOTE — PLAN OF CARE
Pt developed a fever this AM. Tylenol was provided. Sepsis protocol appeared. Will assess again and notified MD. Patient has been NPO for angiogram. Consent has been signed and checklist has been completed. Call light within reach.  All medications provided [place] fall precautions as indicated by assessment.  - Educate pt/family on patient safety including physical limitations  - Instruct pt to call for assistance with activity based on assessment  - Modify environment to reduce risk of injury  - Provide a

## 2019-06-18 PROCEDURE — 99233 SBSQ HOSP IP/OBS HIGH 50: CPT | Performed by: INTERNAL MEDICINE

## 2019-06-18 PROCEDURE — 99233 SBSQ HOSP IP/OBS HIGH 50: CPT | Performed by: HOSPITALIST

## 2019-06-18 PROCEDURE — 30233N1 TRANSFUSION OF NONAUTOLOGOUS RED BLOOD CELLS INTO PERIPHERAL VEIN, PERCUTANEOUS APPROACH: ICD-10-PCS | Performed by: ORTHOPAEDIC SURGERY

## 2019-06-18 RX ORDER — SODIUM CHLORIDE 0.9 % (FLUSH) 0.9 %
10 SYRINGE (ML) INJECTION AS NEEDED
Status: DISCONTINUED | OUTPATIENT
Start: 2019-06-18 | End: 2019-06-22

## 2019-06-18 RX ORDER — SODIUM CHLORIDE 9 MG/ML
INJECTION, SOLUTION INTRAVENOUS ONCE
Status: DISCONTINUED | OUTPATIENT
Start: 2019-06-18 | End: 2019-06-22

## 2019-06-18 RX ORDER — SODIUM CHLORIDE 9 MG/ML
INJECTION, SOLUTION INTRAVENOUS ONCE
Status: COMPLETED | OUTPATIENT
Start: 2019-06-18 | End: 2019-06-18

## 2019-06-18 RX ORDER — SODIUM CHLORIDE 9 MG/ML
INJECTION, SOLUTION INTRAVENOUS CONTINUOUS
Status: DISCONTINUED | OUTPATIENT
Start: 2019-06-19 | End: 2019-06-19

## 2019-06-18 NOTE — CONSULTS
Dx: gangrene right foot. Plan is BKA tomorrow. Discussed with patient and wife in room, Dr. Christopher Foster, and Dr. Frida Witt.  Discussed with Dr. Jeimy Guaman by phone, will transfuse 2 units PRBC preop for anticipated loss during surgery in patient with hgb 7.0 and cardi

## 2019-06-18 NOTE — PROGRESS NOTES
[facility]    Progress Note    Osbaldo Stephenson Patient Status:  Inpatient    1950 MRN [id number]   Location [facility] 5SW/SE Attending Lm Sun MD   Hosp Day # 4 PCP Augustin Delgado DO       Subjective:     s/p an reversible ischemia. EF 41%, global LV hypokinesis  -cardiology consult pre op  -Echo    Acute renal failure, on admission Cr~2.6  H/o Chronic kidney disease stage 3.   Baseline Cr~1.8  -Gentle hydration, monitor for now  -Renal consult, d/w Dr. Joseph Mckinnon

## 2019-06-18 NOTE — PLAN OF CARE
Problem: Patient Centered Care  Goal: Patient preferences are identified and integrated in the patient's plan of care  Description  Interventions:  - What would you like us to know as we care for you?  \"my foot hurts\"  - Provide timely, complete, and ac appropriate pain scale  - Administer analgesics based on type and severity of pain and evaluate response  - Implement non-pharmacological measures as appropriate and evaluate response  - Consider cultural and social influences on pain and pain management interpreters to assist at discharge as needed  - Consider post-discharge preferences of patient/family/discharge partner  - Complete POLST form as appropriate  - Assess patient's ability to be responsible for managing their own health  - Refer to [facility]

## 2019-06-18 NOTE — PROGRESS NOTES
[facility]    Progress Note      Subjective:     Lying comfortably - some pain in the leg       Review of Systems:   Constitutional: fatigue  Eyes: negative for irritation, redness and visual disturbance  Ears, nose, mouth, throat, and fa Push 200 mg 200 mg Intravenous Daily   Alfuzosin HCl ER (UROXATRAL) 24 hr tab 10 mg 10 mg Oral Daily with breakfast   aspirin EC tab 81 mg 81 mg Oral Daily   atorvastatin (LIPITOR) tab 20 mg 20 mg Oral Nightly   Insulin Aspart Pen (NOVOLOG) 100 UNIT/ML fle GFRAA 33* 37* 34*   GFRNAA 29* 32* 30*   CA 8.1* 8.3* 8.1*   ALB 1.8* 1.7* 1.6*   * 135* 137   K 3.9 4.3 4.8    108 108   CO2 21.0 19.0* 20.0*   ALKPHO  --  144*  --    AST  --  9*  --    ALT  --  12*  --    BILT  --  0.4  --    TP  --  6.0*

## 2019-06-18 NOTE — CONSULTS
[facility]    PATIENT'S NAME: Carlos Mitzi   ATTENDING PHYSICIAN: Bob Tello MD   CONSULTING PHYSICIAN: Martin Poe MD   PATIENT ACCOUNT#:   [id number]    LOCATION:  [address] [place], [place] [address] [address] RECORD #:   [id number]       DATE OF BIR hypertension, acute on chronic diastolic congestive heart failure, chronic right-sided heart failure, cardiac pacemaker, complete heart block, localized edema. PAST SURGICAL HISTORY:  Coronary bypass.   The patient was supposed to follow up with Dr. Maren Lange salvageable and, therefore, it would not be necessary to perform the second vascular procedure for the anterior tibialis artery. Discussed with the patient and the family that I would recommend below-knee amputation.   I discussed risks such as death, hear

## 2019-06-18 NOTE — PLAN OF CARE
Patient alert and oriented x4. Vital signs stable, denies pain, no signs of distress. Pt in bed eating late breakfast.  Call light within reach, bed in lowest position locked,  will continue to monitor.

## 2019-06-18 NOTE — PROGRESS NOTES
[place] [facility]    Progress Note    Pamela Collar Patient Status:  Inpatient    1950 MRN [id number]   Location [facility] 5SW/SE Attending Trever Sanchez MD   Hosp Day # 4 PCP Yolanda Silva DO         Assessment and Plan: HCl ER  10 mg Oral Daily with breakfast   • aspirin  81 mg Oral Daily   • atorvastatin  20 mg Oral Nightly   • Insulin Aspart Pen  1-11 Units Subcutaneous TID CC and HS   • Insulin Aspart Pen  4 Units Subcutaneous TID CC   • meropenem  500 mg Intravenous Q

## 2019-06-18 NOTE — PROGRESS NOTES
[facility] ID PROGRESS NOTE    Chuck Pressley Patient Status:  Inpatient    1950 MRN [id number]   Location [facility] Attending Armando Schrader MD   Hosp Day # 4 PCP Celi Cotton, DO     Subjective:  Awake, no new comp (acute kidney injury) ([facility] [address]      ASSESSMENT:    Antibiotics: Vancomycin, meropenem  Cefepime      Patient is a 77year-old male with a history of uncontrolled T2DM, CHF, CAD (s/p CABG 2011), complete heart block (s/p PPM), carotid stenosis, HTN, HLD, CKD,

## 2019-06-18 NOTE — PROGRESS NOTES
06/17/19  1702   BP: 131/66   Pulse: 86   Resp: 18   Temp: 98.1 °F (36.7 °C)   Patient was seen resting comfortably in bed he is now status post angioplasty by Dr. Ulises sharma.     Objective: I looked at the foot again and the black gangrenous changes are

## 2019-06-18 NOTE — DIETARY NOTE
Education Nutrition Note    Received consult for diet education. Provided pt diet instructions and hand out on basic DM diet guidelines and Cardiac diet. Encouraged pt to visit DM outpt Center for comprehensive diet education and management.  Pt verbalized

## 2019-06-18 NOTE — PROGRESS NOTES
[facility]    Progress Note    Amor Liriano Patient Status:  Inpatient    1950 MRN [id number]   Location [facility] 5SW/SE Attending Ephraim Guidry MD   Hosp Day # 3 PCP Demetris Stover DO       Subjective:     Lyn Alvarado test shows fixed perfusion defect of the inferior wall, no evidence of reversible ischemia. EF 41%, global LV hypokinesis  -cardiology consult pre op    Acute renal failure, on admission Cr~2.6  H/o Chronic kidney disease stage 3.   Baseline Cr~1.8  -Gentl

## 2019-06-19 ENCOUNTER — ANESTHESIA (OUTPATIENT)
Dept: SURGERY | Facility: HOSPITAL | Age: 69
DRG: 239 | End: 2019-06-19
Payer: MEDICARE

## 2019-06-19 ENCOUNTER — ANESTHESIA EVENT (OUTPATIENT)
Dept: SURGERY | Facility: HOSPITAL | Age: 69
DRG: 239 | End: 2019-06-19
Payer: MEDICARE

## 2019-06-19 PROCEDURE — 99233 SBSQ HOSP IP/OBS HIGH 50: CPT | Performed by: INTERNAL MEDICINE

## 2019-06-19 PROCEDURE — 0Y6H0Z1 DETACHMENT AT RIGHT LOWER LEG, HIGH, OPEN APPROACH: ICD-10-PCS | Performed by: ORTHOPAEDIC SURGERY

## 2019-06-19 RX ORDER — HYDROMORPHONE HYDROCHLORIDE 1 MG/ML
0.2 INJECTION, SOLUTION INTRAMUSCULAR; INTRAVENOUS; SUBCUTANEOUS EVERY 5 MIN PRN
Status: DISCONTINUED | OUTPATIENT
Start: 2019-06-19 | End: 2019-06-19 | Stop reason: HOSPADM

## 2019-06-19 RX ORDER — HYDROCODONE BITARTRATE AND ACETAMINOPHEN 5; 325 MG/1; MG/1
1 TABLET ORAL AS NEEDED
Status: DISCONTINUED | OUTPATIENT
Start: 2019-06-19 | End: 2019-06-19 | Stop reason: HOSPADM

## 2019-06-19 RX ORDER — GLYCOPYRROLATE 0.2 MG/ML
INJECTION, SOLUTION INTRAMUSCULAR; INTRAVENOUS AS NEEDED
Status: DISCONTINUED | OUTPATIENT
Start: 2019-06-19 | End: 2019-06-19 | Stop reason: SURG

## 2019-06-19 RX ORDER — HEPARIN SODIUM 5000 [USP'U]/ML
5000 INJECTION, SOLUTION INTRAVENOUS; SUBCUTANEOUS EVERY 12 HOURS SCHEDULED
Status: DISCONTINUED | OUTPATIENT
Start: 2019-06-19 | End: 2019-06-22

## 2019-06-19 RX ORDER — DEXTROSE MONOHYDRATE 25 G/50ML
50 INJECTION, SOLUTION INTRAVENOUS AS NEEDED
Status: DISCONTINUED | OUTPATIENT
Start: 2019-06-19 | End: 2019-06-22

## 2019-06-19 RX ORDER — HYDROMORPHONE HYDROCHLORIDE 1 MG/ML
0.4 INJECTION, SOLUTION INTRAMUSCULAR; INTRAVENOUS; SUBCUTANEOUS EVERY 5 MIN PRN
Status: DISCONTINUED | OUTPATIENT
Start: 2019-06-19 | End: 2019-06-19 | Stop reason: HOSPADM

## 2019-06-19 RX ORDER — HYDROCODONE BITARTRATE AND ACETAMINOPHEN 5; 325 MG/1; MG/1
2 TABLET ORAL AS NEEDED
Status: DISCONTINUED | OUTPATIENT
Start: 2019-06-19 | End: 2019-06-19 | Stop reason: HOSPADM

## 2019-06-19 RX ORDER — EPHEDRINE SULFATE 50 MG/ML
INJECTION, SOLUTION INTRAVENOUS AS NEEDED
Status: DISCONTINUED | OUTPATIENT
Start: 2019-06-19 | End: 2019-06-19 | Stop reason: SURG

## 2019-06-19 RX ORDER — NALOXONE HYDROCHLORIDE 0.4 MG/ML
80 INJECTION, SOLUTION INTRAMUSCULAR; INTRAVENOUS; SUBCUTANEOUS AS NEEDED
Status: DISCONTINUED | OUTPATIENT
Start: 2019-06-19 | End: 2019-06-19 | Stop reason: HOSPADM

## 2019-06-19 RX ORDER — HYDRALAZINE HYDROCHLORIDE 50 MG/1
50 TABLET, FILM COATED ORAL EVERY 8 HOURS SCHEDULED
Status: DISCONTINUED | OUTPATIENT
Start: 2019-06-19 | End: 2019-06-22

## 2019-06-19 RX ORDER — ONDANSETRON 2 MG/ML
INJECTION INTRAMUSCULAR; INTRAVENOUS AS NEEDED
Status: DISCONTINUED | OUTPATIENT
Start: 2019-06-19 | End: 2019-06-19 | Stop reason: SURG

## 2019-06-19 RX ORDER — HALOPERIDOL 5 MG/ML
0.25 INJECTION INTRAMUSCULAR ONCE AS NEEDED
Status: DISCONTINUED | OUTPATIENT
Start: 2019-06-19 | End: 2019-06-19 | Stop reason: HOSPADM

## 2019-06-19 RX ORDER — HYDROMORPHONE HYDROCHLORIDE 1 MG/ML
0.6 INJECTION, SOLUTION INTRAMUSCULAR; INTRAVENOUS; SUBCUTANEOUS EVERY 5 MIN PRN
Status: DISCONTINUED | OUTPATIENT
Start: 2019-06-19 | End: 2019-06-19 | Stop reason: HOSPADM

## 2019-06-19 RX ORDER — SODIUM CHLORIDE 0.9 % (FLUSH) 0.9 %
10 SYRINGE (ML) INJECTION AS NEEDED
Status: DISCONTINUED | OUTPATIENT
Start: 2019-06-19 | End: 2019-06-22

## 2019-06-19 RX ORDER — AMLODIPINE BESYLATE 10 MG/1
10 TABLET ORAL
Status: DISCONTINUED | OUTPATIENT
Start: 2019-06-20 | End: 2019-06-22

## 2019-06-19 RX ORDER — MORPHINE SULFATE 4 MG/ML
2 INJECTION, SOLUTION INTRAMUSCULAR; INTRAVENOUS EVERY 10 MIN PRN
Status: DISCONTINUED | OUTPATIENT
Start: 2019-06-19 | End: 2019-06-19 | Stop reason: HOSPADM

## 2019-06-19 RX ORDER — CEFAZOLIN SODIUM/WATER 2 G/20 ML
2 SYRINGE (ML) INTRAVENOUS EVERY 8 HOURS
Status: DISCONTINUED | OUTPATIENT
Start: 2019-06-19 | End: 2019-06-19

## 2019-06-19 RX ORDER — MORPHINE SULFATE 4 MG/ML
4 INJECTION, SOLUTION INTRAMUSCULAR; INTRAVENOUS EVERY 10 MIN PRN
Status: DISCONTINUED | OUTPATIENT
Start: 2019-06-19 | End: 2019-06-19 | Stop reason: HOSPADM

## 2019-06-19 RX ORDER — FUROSEMIDE 40 MG/1
40 TABLET ORAL DAILY
Status: DISCONTINUED | OUTPATIENT
Start: 2019-06-19 | End: 2019-06-19

## 2019-06-19 RX ORDER — SODIUM CHLORIDE 9 MG/ML
INJECTION, SOLUTION INTRAVENOUS CONTINUOUS PRN
Status: DISCONTINUED | OUTPATIENT
Start: 2019-06-19 | End: 2019-06-19 | Stop reason: SURG

## 2019-06-19 RX ORDER — METOPROLOL SUCCINATE 25 MG/1
25 TABLET, EXTENDED RELEASE ORAL
Status: DISCONTINUED | OUTPATIENT
Start: 2019-06-19 | End: 2019-06-20

## 2019-06-19 RX ORDER — INSULIN ASPART 100 [IU]/ML
6 INJECTION, SOLUTION INTRAVENOUS; SUBCUTANEOUS ONCE
Status: COMPLETED | OUTPATIENT
Start: 2019-06-19 | End: 2019-06-19

## 2019-06-19 RX ORDER — ONDANSETRON 2 MG/ML
4 INJECTION INTRAMUSCULAR; INTRAVENOUS ONCE AS NEEDED
Status: DISCONTINUED | OUTPATIENT
Start: 2019-06-19 | End: 2019-06-19 | Stop reason: HOSPADM

## 2019-06-19 RX ORDER — PHENYLEPHRINE HCL 10 MG/ML
VIAL (ML) INJECTION AS NEEDED
Status: DISCONTINUED | OUTPATIENT
Start: 2019-06-19 | End: 2019-06-19 | Stop reason: SURG

## 2019-06-19 RX ORDER — SODIUM CHLORIDE, SODIUM LACTATE, POTASSIUM CHLORIDE, CALCIUM CHLORIDE 600; 310; 30; 20 MG/100ML; MG/100ML; MG/100ML; MG/100ML
INJECTION, SOLUTION INTRAVENOUS CONTINUOUS
Status: DISCONTINUED | OUTPATIENT
Start: 2019-06-19 | End: 2019-06-19 | Stop reason: HOSPADM

## 2019-06-19 RX ORDER — MORPHINE SULFATE 10 MG/ML
6 INJECTION, SOLUTION INTRAMUSCULAR; INTRAVENOUS EVERY 10 MIN PRN
Status: DISCONTINUED | OUTPATIENT
Start: 2019-06-19 | End: 2019-06-19 | Stop reason: HOSPADM

## 2019-06-19 RX ORDER — DEXAMETHASONE SODIUM PHOSPHATE 4 MG/ML
VIAL (ML) INJECTION AS NEEDED
Status: DISCONTINUED | OUTPATIENT
Start: 2019-06-19 | End: 2019-06-19 | Stop reason: SURG

## 2019-06-19 RX ORDER — MIDAZOLAM HYDROCHLORIDE 1 MG/ML
INJECTION INTRAMUSCULAR; INTRAVENOUS AS NEEDED
Status: DISCONTINUED | OUTPATIENT
Start: 2019-06-19 | End: 2019-06-19 | Stop reason: SURG

## 2019-06-19 RX ORDER — PROCHLORPERAZINE EDISYLATE 5 MG/ML
5 INJECTION INTRAMUSCULAR; INTRAVENOUS ONCE AS NEEDED
Status: DISCONTINUED | OUTPATIENT
Start: 2019-06-19 | End: 2019-06-19 | Stop reason: HOSPADM

## 2019-06-19 RX ORDER — LIDOCAINE HYDROCHLORIDE 10 MG/ML
INJECTION, SOLUTION EPIDURAL; INFILTRATION; INTRACAUDAL; PERINEURAL AS NEEDED
Status: DISCONTINUED | OUTPATIENT
Start: 2019-06-19 | End: 2019-06-19 | Stop reason: SURG

## 2019-06-19 RX ORDER — SODIUM CHLORIDE 9 MG/ML
INJECTION, SOLUTION INTRAVENOUS ONCE
Status: DISCONTINUED | OUTPATIENT
Start: 2019-06-19 | End: 2019-06-22

## 2019-06-19 RX ORDER — DEXTROSE MONOHYDRATE 25 G/50ML
50 INJECTION, SOLUTION INTRAVENOUS
Status: DISCONTINUED | OUTPATIENT
Start: 2019-06-19 | End: 2019-06-19 | Stop reason: HOSPADM

## 2019-06-19 RX ADMIN — PHENYLEPHRINE HCL 100 MCG: 10 MG/ML VIAL (ML) INJECTION at 16:12:00

## 2019-06-19 RX ADMIN — ONDANSETRON 4 MG: 2 INJECTION INTRAMUSCULAR; INTRAVENOUS at 15:53:00

## 2019-06-19 RX ADMIN — SODIUM CHLORIDE: 9 INJECTION, SOLUTION INTRAVENOUS at 17:32:00

## 2019-06-19 RX ADMIN — EPHEDRINE SULFATE 10 MG: 50 INJECTION, SOLUTION INTRAVENOUS at 16:15:00

## 2019-06-19 RX ADMIN — GLYCOPYRROLATE 0.2 MG: 0.2 INJECTION, SOLUTION INTRAMUSCULAR; INTRAVENOUS at 15:53:00

## 2019-06-19 RX ADMIN — MIDAZOLAM HYDROCHLORIDE 2 MG: 1 INJECTION INTRAMUSCULAR; INTRAVENOUS at 15:51:00

## 2019-06-19 RX ADMIN — PHENYLEPHRINE HCL 100 MCG: 10 MG/ML VIAL (ML) INJECTION at 16:16:00

## 2019-06-19 RX ADMIN — SODIUM CHLORIDE: 9 INJECTION, SOLUTION INTRAVENOUS at 15:47:00

## 2019-06-19 RX ADMIN — PHENYLEPHRINE HCL 100 MCG: 10 MG/ML VIAL (ML) INJECTION at 16:19:00

## 2019-06-19 RX ADMIN — DEXAMETHASONE SODIUM PHOSPHATE 4 MG: 4 MG/ML VIAL (ML) INJECTION at 15:53:00

## 2019-06-19 RX ADMIN — EPHEDRINE SULFATE 20 MG: 50 INJECTION, SOLUTION INTRAVENOUS at 16:17:00

## 2019-06-19 RX ADMIN — LIDOCAINE HYDROCHLORIDE 50 MG: 10 INJECTION, SOLUTION EPIDURAL; INFILTRATION; INTRACAUDAL; PERINEURAL at 15:53:00

## 2019-06-19 NOTE — PLAN OF CARE
Phone call was made to Mary Jo Monroy at [facility] regarding patient pacemaker. Mary Jo Monroy is aware that patient is going to surgery within 20 minutes, and is aware that is urgent that some one call back. I will await phone call back.

## 2019-06-19 NOTE — PROGRESS NOTES
[facility] ID PROGRESS NOTE    Derl Manual Patient Status:  Inpatient    1950 MRN [id number]   Location [facility] Attending Sohail Holden MD   Hosp Day # 5 PCP Loida Lieberman DO     Subjective:  Low grade temps.  Cally Pascal [facility])      ASSESSMENT:    Antibiotics: Vancomycin, meropenem  Cefepime      Patient is a 77year-old male with a history of uncontrolled T2DM, CHF, CAD (s/p CABG 2011), complete heart block (s/p PPM), carotid stenosis, HTN, HLD, CKD, former smoker, noncomp

## 2019-06-19 NOTE — ANESTHESIA POSTPROCEDURE EVALUATION
Patient: Huy Smith    Procedure Summary     Date:  06/19/19 Room / Location:  [facility] OR  / [facility] OR    Anesthesia Start:  2764 Anesthesia Stop:  5876    Procedure:  KNEE AMPUTATION ABOVE/BELOW (Right ) Diagnosis:       Gangrene of right foot (HC

## 2019-06-19 NOTE — PROGRESS NOTES
[facility]    Progress Note    Cristianche Connore Patient Status:  Inpatient    1950 MRN [id number]   Location [facility] 5SW/SE Attending Gayla Paez MD   Hosp Day # 5 PCP Leonie Lay DO         Assessment and Plan: Beta Blocker   • insulin detemir  7 Units Subcutaneous Daily   • sodium chloride   Intravenous Once   • iron sucrose  200 mg Intravenous Daily   • Alfuzosin HCl ER  10 mg Oral Daily with breakfast   • aspirin  81 mg Oral Daily   • atorvastatin  20 mg Oral

## 2019-06-19 NOTE — PROGRESS NOTES
[facility]    Progress Note    Osbaldo Stephenson Patient Status:  Inpatient    1950 MRN [id number]   Location [facility] 5SW/SE Attending Lm Sun MD   Hosp Day # 5 PCP Augustin Delgado DO         Patient in OR at [facility] Component Value Date    WBC 18.2 (H) 06/19/2019    HGB 10.1 (L) 06/19/2019    HCT 30.3 (L) 06/19/2019    .0 06/19/2019    CREATSERUM 2.33 (H) 06/19/2019    BUN 42 (H) 06/19/2019     06/19/2019    K 4.5 06/19/2019     06/19/2019    CO2

## 2019-06-19 NOTE — CM/SW NOTE
Pt has been screened per chart review and during nursing rounds. Pt is from home w/ spouse, AOx4, room air. Per RN, plan for right BKA today. PT/OT to evaluate for recommendations when appropriate. SW will f/u w/ recommendations post-sx.      Moody Band,

## 2019-06-19 NOTE — ANESTHESIA PROCEDURE NOTES
Airway  Date/Time: 6/19/2019 3:56 PM  Urgency: elective    Airway not difficult    General Information and Staff    Patient location during procedure: OR  Anesthesiologist: Ekaterina Sprague MD  Resident/CRNA: Sarai Frank CRNA  Performed: CRNA     Ind

## 2019-06-19 NOTE — ANESTHESIA PREPROCEDURE EVALUATION
Anesthesia PreOp Note    HPI:     Jose A Gipson is a 77year old male who presents for preoperative consultation requested by: Sylvia Lee MD    Date of Surgery: 6/14/2019 - 6/19/2019    Procedure(s):  KNEE AMPUTATION ABOVE/BELOW  Indication: Camryn Jasmine uncontrolled    • Unspecified essential hypertension        Past Surgical History:   Procedure Laterality Date   • CABG  2010    per [facility]: quadrupal bypass   • CARDIAC PACEMAKER PLACEMENT  10/30/2018    [facility]   • COLONOSCOPY & POLYPECTOMY  05/11/2011 Hold] Insulin Regular Human (NOVOLIN R) 100 UNIT/ML injection 1-11 Units 1-11 Units Subcutaneous 4 times per day Lianne Armendariz MD 1 Units at 06/19/19 1321    [MAR Hold] Metoprolol Succinate ER (Toprol XL) 24 hr tab 25 mg 25 mg Oral Daily Beta Blocke Saline Flush 0.9 % injection 3 mL 3 mL Intravenous PRN Jose Smith MD     [MAR Hold] Heparin Sodium (Porcine) 5000 UNIT/ML injection 5,000 Units 5,000 Units Subcutaneous 2 times per day Jose Smith MD 5,000 Units at 06/18/19 0900    [MAR Hold] ac file      Number of children: Not on file      Years of education: Not on file      Highest education level: Not on file    Occupational History      Not on file    Social Needs      Financial resource strain: Not on file      Food insecurity:        Worry 30.3 (L) 06/19/2019    MCV 88.9 06/19/2019    MCH 29.6 06/19/2019    MCHC 33.3 06/19/2019    RDW 13.3 06/19/2019    .0 06/19/2019     Lab Results   Component Value Date     06/19/2019    K 4.5 06/19/2019     06/19/2019    CO2 21.0 06/19/ carotid artery disease.    Informed Consent Plan and Risks Discussed With:  Patient  Discussed plan with:  Surgeon and CRNA      I have informed Willie Flakes and/or legal guardian or family member of the nature of the anesthetic plan, benefits, risks inclu

## 2019-06-19 NOTE — PLAN OF CARE
Phone call was received from Simran Gonzales, Representative from [address] [place] [address] regarding patient on the way to surgery. [place] is aware that patient is going to surgery. No further orders needed.  I will endorsed to pre-op nurse that they may want to ha

## 2019-06-19 NOTE — PLAN OF CARE
Problem: Patient Centered Care  Goal: Patient preferences are identified and integrated in the patient's plan of care  Description  Interventions:  - What would you like us to know as we care for you?  \"my foot hurts\"  - Provide timely, complete, and ac appropriate and evaluate response  - Consider cultural and social influences on pain and pain management  - Manage/alleviate anxiety  - Utilize distraction and/or relaxation techniques  - Monitor for opioid side effects  - Notify MD/LIP if interventions un for coordinating discharge planning if the patient needs post-hospital services based on physician/LIP order or complex needs related to functional status, cognitive ability or social support system  Outcome: Progressing     Problem: SKIN/TISSUE INTEGRITY

## 2019-06-19 NOTE — BRIEF OP NOTE
Pre-Operative Diagnosis: Gangrene of right foot ([facility] [address] [I96]     Post-Operative Diagnosis: Same     Procedure Performed: RIGHT BELOW KNEE AMPUTATION    Surgeon(s) and Role: Mio Anglin MD - Primary    Assistant(s):  Christina Flores PA-C    Anesth:

## 2019-06-20 PROCEDURE — 99232 SBSQ HOSP IP/OBS MODERATE 35: CPT | Performed by: INTERNAL MEDICINE

## 2019-06-20 PROCEDURE — 99233 SBSQ HOSP IP/OBS HIGH 50: CPT | Performed by: HOSPITALIST

## 2019-06-20 RX ORDER — METOPROLOL SUCCINATE 25 MG/1
25 TABLET, EXTENDED RELEASE ORAL
Status: DISPENSED | OUTPATIENT
Start: 2019-06-20 | End: 2019-06-22

## 2019-06-20 NOTE — PHYSICAL THERAPY NOTE
PHYSICAL THERAPY EVALUATION - INPATIENT     Room Number: 541/541-A  Evaluation Date: 6/20/2019  Type of Evaluation: Initial   Physician Order: PT Eval and Treat    Presenting Problem: right bka  Reason for Therapy: Mobility Dysfunction and Discharge Plann ml/min ([facility] [address]    BRIANNE (acute kidney injury) ([facility] [address]      Past Medical History  Past Medical History:   Diagnosis Date   • ADHD (attention deficit hyperactivity disorder)    • Atherosclerosis of coronary artery    • Other and unspecified hyperlipidemia    • Typ except for the following:  left hip 4/5, right hip not resisted, 3/5 observed. Knee not tested, in extension.      BALANCE  Static Sitting: Poor  Dynamic Sitting: Poor  Static Standing: Poor  Dynamic Standing: Poor    ADDITIONAL TESTS  Additional Tests: Non demonstrate supine - sit EOB @ level: supervision     Goal #1   Current Status    Goal #2 Patient is able to demonstrate transfers Sit to/from Stand at assistance level: minimum assistance with walker - rolling     Goal #2  Current Status    Goal #3 [place]

## 2019-06-20 NOTE — OPERATIVE REPORT
[facility]    PATIENT'S NAME: Randolph Arthur   ATTENDING PHYSICIAN: Jimena Lei MD   OPERATING PHYSICIAN: Jackson Myles MD   PATIENT ACCOUNT#:   [id number]    LOCATION:  [address] [place] [address] #:   [id number]       DATE OF BIRTH: Betadine provisionally. A formal prep and drape was done with ChloraPrep. Again, the tourniquet was not used during the case. A point approximately 13 mm distal to the tibial tubercle was measured. A skin incision was just distal to this.   The tibia Fluid replaced was 600 mL. The specimen was the below-knee amputation to Pathology. No drain was placed in the operating room, and there were no complications.   The patient will be placed on heparin subcutaneous 5000 mg twice a day and resume aspirin 81

## 2019-06-20 NOTE — PROGRESS NOTES
[facility]    Progress Note    Mike Kaye Patient Status:  Inpatient    1950 MRN [id number]   Location [facility] 5SW/SE Attending Edison Mcelroy MD   Hosp Day # 6 PCP Megan Boyce DO        Subjective:   Subject

## 2019-06-20 NOTE — PROGRESS NOTES
[facility]    Progress Note    Den Carrington Patient Status:  Inpatient    1950 MRN [id number]   Location [facility] 5SW/SE Attending Indiana Rojas MD   Hosp Day # 5 PCP Giuseppe Richardson DO       Subjective:    Lor Salazar abnormalities noted  Musculoskeletal: full ROM all extremities good strength  no deformities  Extremities: Right BKA  Neurological:  Grossly normal    Results:     Laboratory Data:  Lab Results   Component Value Date    WBC 18.2 (H) 06/19/2019    HGB 10.1

## 2019-06-20 NOTE — PROGRESS NOTES
[facility] HOSP - [facility]    Cardiology Progress Note    Lucian Yun Patient Status:  Inpatient    1950 MRN [id number]   Location [facility] 5SW/SE Attending Carroll Singh MD   Hosp Day # 6 PCP Barbara Horn DO     Primary Cardiol results and no new symptoms patient is medically stable and low risk from a cardiac standpoint for acute cardiac events of vascular procedures or surgery as needed.  Will monitor on remote telemetry and follow.     CAD with new findings - no anginal sympto

## 2019-06-20 NOTE — OCCUPATIONAL THERAPY NOTE
OCCUPATIONAL THERAPY EVALUATION - INPATIENT      Room Number: 541/541-A  Evaluation Date: 6/20/2019  Type of Evaluation: Initial  Presenting Problem: (s/p RT BKA)    Physician Order: IP Consult to Occupational Therapy  Reason for Therapy: ADL/IADL Dysfunct training;Patient/Family training;Equipment eval/education; Compensatory technique education       OCCUPATIONAL THERAPY MEDICAL/SOCIAL HISTORY     Problem List   Principal Problem:    Necrotic toes ([facility] [address]  Active Problems:    Gangrene of right foot ([facility] [address]    C ASSESSMENT  Rating: 3  Location: (RT LE)  Management Techniques: Repositioning    ACTIVITY TOLERANCE  Fair- requires frequent rest breaks, cues to refrain from breath holding during exertion      COGNITION  A and O, following commands    RANGE OF MOTION with min A for participation in supine self care  Comment:     Comment:         Goals  on:19  Frequency:3-5x/week

## 2019-06-20 NOTE — PROGRESS NOTES
[facility] dosing service    Follow-up Pharmacokinetic Consult for Vancomycin Dosing     Lucian Yun is a 77year old male who is being treated for osteomyelitis. Patient is on day 6 of Vancomycin and add is currently receiving 1 gm IV Q 24 hours.   G

## 2019-06-20 NOTE — PROGRESS NOTES
[facility]    Progress Note    Shakir Custer Patient Status:  Inpatient    1950 MRN [id number]   Location [facility] 5SW/SE Attending Enrique Martinez MD   Hosp Day # 6 PCP Bassem Duvall DO       Subjective:    Samanta Camara Insulin Aspart Pen  1-5 Units Subcutaneous TID CC   • vancomycin  750 mg Intravenous Q24H   • sodium chloride   Intravenous Once   • [MAR Hold] iron sucrose  200 mg Intravenous Daily   • Alfuzosin HCl ER  10 mg Oral Daily with breakfast   • aspirin  81 mg 30* 28* 30*   CA 8.3* 8.1* 8.6 8.6   ALB 1.7* 1.6* 1.8*  --    * 137 138 137   K 4.3 4.8 4.5 5.1    108 108 109   CO2 19.0* 20.0* 21.0 18.0*   ALKPHO 144*  --   --   --    AST 9*  --   --   --    ALT 12*  --   --   --    BILT 0.4  --   --   -- disease stage 3. Baseline Cr~1.8  -Gentle hydration, monitor for now  -Renal consult, d/w Dr. Leigh Ann Clifford     Diabetes mellitus type 2.   A1c 6.7  On 70/30 at home  -Per protocol, adjust insulins as needed  -Control difficult because of patient erratic oral in

## 2019-06-20 NOTE — PLAN OF CARE
Patient presents with vss. S/P right below the knee amputation surgical drs intact no drainage noted. Prn norco and morphine for moderate pain. Fall precautions in place. Belongings within reach.

## 2019-06-20 NOTE — PROGRESS NOTES
[facility]    Progress Note    Jose A Gipson Patient Status:  Inpatient    1950 MRN [id number]   Location [facility] 5SW/SE Attending Antonette Jones MD   Hosp Day # 6 PCP Pattie Cordova DO       Subjective:    Romeo Danger normal  Skin/Hair: no unusual rashes present, no abnormal bruising noted  Back/Spine: no abnormalities noted  Musculoskeletal: full ROM all extremities good strength  no deformities  Extremities: Right BKA  Neurological:  Grossly normal    Results:     Lab

## 2019-06-20 NOTE — PLAN OF CARE
Patient received in bed from recovery. Alert and oriented x 4. Patient made comfortable in the bed. Vital signs taken and stable. Surgical dressing to right lower extremity is dry and intact. No drainage noted. Call light placed within reach.  Patient instr

## 2019-06-20 NOTE — PLAN OF CARE
Dr. Sandi Kenney was notified regarding Sepsis alert that fired off upon patient's returned from surgery. Vital signs taken and stable. No furthers orders obtained by doctor.

## 2019-06-21 PROCEDURE — 99233 SBSQ HOSP IP/OBS HIGH 50: CPT | Performed by: HOSPITALIST

## 2019-06-21 PROCEDURE — 99232 SBSQ HOSP IP/OBS MODERATE 35: CPT | Performed by: INTERNAL MEDICINE

## 2019-06-21 RX ORDER — METOPROLOL SUCCINATE 50 MG/1
50 TABLET, EXTENDED RELEASE ORAL
Status: DISCONTINUED | OUTPATIENT
Start: 2019-06-22 | End: 2019-06-22

## 2019-06-21 RX ORDER — FAMOTIDINE 20 MG/1
10 TABLET ORAL 2 TIMES DAILY
Status: DISCONTINUED | OUTPATIENT
Start: 2019-06-21 | End: 2019-06-22

## 2019-06-21 NOTE — PHYSICAL THERAPY NOTE
PHYSICAL THERAPY TREATMENT NOTE - INPATIENT     Room Number: 335/084-V       Presenting Problem: right bka    Problem List  Principal Problem:    Necrotic toes ([address]  Active Problems:    Gangrene of right foot (HCC)    CKD (chronic kidney disease) stage 3, TOLERANCE  Pulse: 98  Heart Rate Source: Monitor                   O2 WALK  SPO2 on Room Air at Rest: 93               AM-PAC '6-Clicks' INPATIENT SHORT FORM - BASIC MOBILITY  How much difficulty does the patient currently have. ..  -   Turning over in bed minimum assistance   Goal #3   Current Status NT   Goal #4 Patient will negotiate 0 stairs/one curb w/ assistive device and supervision   Goal #4   Current Status     Goal #5 Patient to demonstrate independence with home activity/exercise instructions prov

## 2019-06-21 NOTE — OCCUPATIONAL THERAPY NOTE
OCCUPATIONAL THERAPY TREATMENT NOTE - INPATIENT        Room Number: 722/159-W    Presenting Problem: r bka    Problem List  Principal Problem:    Necrotic toes ([address]  Active Problems:    Gangrene of right foot (HCC)    CKD (chronic kidney disease) stage 3, Bearing Restriction: R lower extremity  R Lower Extremity: Non-Weight Bearing    PAIN ASSESSMENT  Ratin  Location: (rle)  Management Techniques: Repositioning; Activity promotion     ACTIVITY TOLERANCE  Limited by pain    ACTIVITIES OF DAILY LIVING ASSE

## 2019-06-21 NOTE — CONSULTS
Clinical Referral Liaison: TAL Fisher/L    Reviewed clinical information with Dr. Kareen Solis on: 06/21/2019    Clinical review recommendations: Based on patient’ current functional status, patient would benefit from Acute rehabilitation,

## 2019-06-21 NOTE — PLAN OF CARE
Blood glucose monitored as ordered  Patient has poor appetite, dietary supplements ordered   Plan to discharge tomorrow, wife and patient aware    Problem: Patient Centered Care  Goal: Patient preferences are identified and integrated in the patient's plan pain scale  - Administer analgesics based on type and severity of pain and evaluate response  - Implement non-pharmacological measures as appropriate and evaluate response  - Consider cultural and social influences on pain and pain management  - Manage/all POLST form as appropriate  - Assess patient's ability to be responsible for managing their own health  - Refer to Case Management Department for coordinating discharge planning if the patient needs post-hospital services based on physician/LIP order or com

## 2019-06-21 NOTE — PROGRESS NOTES
[facility]    Progress Note    Reenaalcira Estrada Patient Status:  Inpatient    1950 MRN [id number]   Location [facility] 5SW/SE Attending Alannah Perez MD   Hosp Day # 7 PCP China Santizo DO        Subjective:     Jordin Leaks

## 2019-06-21 NOTE — PROGRESS NOTES
[facility]    Progress Note    Willie Bryan Patient Status:  Inpatient    1950 MRN [id number]   Location [facility] 5SW/SE Attending Sixto García MD   Hosp Day # 7 PCP Mercedes Slater DO       Subjective:    Jonathan Ferraro noted  Back/Spine: no abnormalities noted  Musculoskeletal: full ROM all extremities good strength  no deformities  Extremities: Right BKA  Neurological:  Grossly normal    Results:     Laboratory Data:  Lab Results   Component Value Date    WBC 14.3 (H) 0

## 2019-06-21 NOTE — PROGRESS NOTES
[facility] - [facility]    Progress Note    Carin Patch Patient Status:  Inpatient    1950 MRN [id number]   Location [facility] 5SW/SE Attending Leena Lan MD   Hosp Day # 7 PCP Samira Bear DO         Assessment and Plan: succinate  25 mg Oral 2x Daily(Beta Blocker)   • insulin detemir  10 Units Subcutaneous Daily   • epoetin amparo-epbx  3,000 Units Subcutaneous Once per day on Mon Wed Fri   • sodium chloride   Intravenous Once   • amLODIPine Besylate  10 mg Oral Daily   • h

## 2019-06-21 NOTE — PROGRESS NOTES
[facility] ID PROGRESS NOTE    Isidra Blanco Patient Status:  Inpatient    1950 MRN [id number]   Location [facility] Attending Cele Wall MD   Hosp Day # 7 PCP Vamsi Rodas DO     Subjective:  Afebrile.  WBC bett meropenem  Cefepime      Patient is a 77year-old male with a history of uncontrolled T2DM, CHF, CAD (s/p CABG 2011), complete heart block (s/p PPM), carotid stenosis, HTN, HLD, CKD, former smoker, noncompliance     He presents to [address] [place] [address] ER  6/14 with R foot

## 2019-06-21 NOTE — CM/SW NOTE
429pm:  SW met w/ pt and pt's wife who asked about bed availability at [facility]. SW spoke w/ Williams Easton from [facility] who stated they do not have any beds available until mid next week.      SW updated pt and wife who are agreeable w/ MJ. PCS form on chart f

## 2019-06-22 VITALS
SYSTOLIC BLOOD PRESSURE: 121 MMHG | WEIGHT: 175.25 LBS | HEART RATE: 75 BPM | HEIGHT: 68 IN | OXYGEN SATURATION: 96 % | DIASTOLIC BLOOD PRESSURE: 60 MMHG | RESPIRATION RATE: 20 BRPM | TEMPERATURE: 99 F | BODY MASS INDEX: 26.56 KG/M2

## 2019-06-22 PROCEDURE — 99239 HOSP IP/OBS DSCHRG MGMT >30: CPT | Performed by: HOSPITALIST

## 2019-06-22 PROCEDURE — 99233 SBSQ HOSP IP/OBS HIGH 50: CPT | Performed by: INTERNAL MEDICINE

## 2019-06-22 RX ORDER — BISACODYL 10 MG
10 SUPPOSITORY, RECTAL RECTAL 4 TIMES DAILY PRN
Refills: 0 | Status: SHIPPED | COMMUNITY
Start: 2019-06-22 | End: 2020-02-04 | Stop reason: ALTCHOICE

## 2019-06-22 RX ORDER — HYDROCODONE BITARTRATE AND ACETAMINOPHEN 5; 325 MG/1; MG/1
1 TABLET ORAL EVERY 4 HOURS PRN
Qty: 20 TABLET | Refills: 0 | Status: SHIPPED | OUTPATIENT
Start: 2019-06-22 | End: 2019-07-26

## 2019-06-22 RX ORDER — METOPROLOL SUCCINATE 50 MG/1
50 TABLET, EXTENDED RELEASE ORAL
Refills: 0 | Status: SHIPPED | COMMUNITY
Start: 2019-06-23 | End: 2019-07-26

## 2019-06-22 RX ORDER — FUROSEMIDE 40 MG/1
40 TABLET ORAL DAILY
Refills: 0 | Status: SHIPPED | COMMUNITY
Start: 2019-06-23 | End: 2019-07-26

## 2019-06-22 RX ORDER — FUROSEMIDE 40 MG/1
40 TABLET ORAL DAILY
Status: DISCONTINUED | OUTPATIENT
Start: 2019-06-23 | End: 2019-06-22

## 2019-06-22 RX ORDER — FAMOTIDINE 10 MG
10 TABLET ORAL 2 TIMES DAILY PRN
Refills: 0 | Status: SHIPPED | COMMUNITY
Start: 2019-06-22 | End: 2020-02-04 | Stop reason: ALTCHOICE

## 2019-06-22 RX ORDER — FUROSEMIDE 40 MG/1
TABLET ORAL
Qty: 90 TABLET | Refills: 1 | Status: SHIPPED | COMMUNITY
Start: 2019-06-22 | End: 2019-06-22

## 2019-06-22 RX ORDER — LACTULOSE 10 G/15ML
20 SOLUTION ORAL 2 TIMES DAILY PRN
Refills: 0 | Status: SHIPPED | COMMUNITY
Start: 2019-06-22 | End: 2020-02-04 | Stop reason: ALTCHOICE

## 2019-06-22 RX ORDER — FUROSEMIDE 10 MG/ML
20 INJECTION INTRAMUSCULAR; INTRAVENOUS ONCE
Status: COMPLETED | OUTPATIENT
Start: 2019-06-22 | End: 2019-06-22

## 2019-06-22 RX ORDER — ACETAMINOPHEN 325 MG/1
650 TABLET ORAL EVERY 4 HOURS PRN
Refills: 0 | Status: SHIPPED | COMMUNITY
Start: 2019-06-22

## 2019-06-22 NOTE — DISCHARGE SUMMARY
[facility]    Discharge Summary    Phu Almeida Patient Status:  Inpatient    1950 MRN [id number]   Location [facility] 5SW/SE Attending Palmira Noble MD   Hosp Day # 8 PCP Doris Albright DO     Date of Admission: 6 follow-up as instructed   Coronary artery disease, h/o CABG   H/o pacemaker (3rd degree AV block)  HTN  HL  -Stress test shows fixed perfusion defect of the inferior wall, no evidence of reversible ischemia.  EF 41%, global LV hypokinesis  -cardiology cons (CST): Cinthia Gomez MD on 6/17/2019 at 10:01     Approved by (CST): Cinthia Gomez MD on 6/17/2019 at 10:10            LABS :     Lab Results   Component Value Date    WBC 14.3 (H) 06/21/2019    HGB 9.2 (L) 06/21/2019    HCT 28.3 (L) 06/21/2019    PLT Take 2 tablets (650 mg total) by mouth every 4 (four) hours as needed. Refills:  0     bisacodyl 10 MG Supp  Commonly known as:  DULCOLAX      Place 1 suppository (10 mg total) rectally 4 (four) times daily as needed.    Refills:  0     epoetin amparo-epbx TABLET BY MOUTH ONCE DAILY   Quantity:  90 tablet  Refills:  1     ASPIRIN ADULT LOW STRENGTH 81 MG Tbec  Generic drug:  aspirin      Take  by mouth.    Refills:  0     atorvastatin 20 MG Tabs  Commonly known as:  LIPITOR      Take 1 tablet (20 mg total) by Sara Vang MD Consulting Physician  Physical Medicine    Justyna Molina MD Consulting Physician  eBbo Jaffe MD Consulting Physician  SURGERY, ORTHOPEDIC    Moon Sulliavn MD Consulting Physician  Ca

## 2019-06-22 NOTE — PROGRESS NOTES
[facility]    Progress Note    Isidra Blanco Patient Status:  Inpatient    1950 MRN [id number]   Location [facility] 5SW/SE Attending Roosvelt Habermann, MD   Hosp Day # 8 PCP Vamsi Rodas DO       Subjective:    [place]

## 2019-06-22 NOTE — CM/SW NOTE
Notified Arelis Ortega of patients discharge order    Arelis Ortega will call CTL when bed assigned today    CTL spoke with Arelis Ortega, they are requesting BMP and CBC to be drawn and resulted prior to patients transfer    Once blood work results are available a

## 2019-06-22 NOTE — PROGRESS NOTES
[facility]    Progress Note    Den Carrington Patient Status:  Inpatient    1950 MRN [id number]   Location [facility] 5SW/SE Attending Indiana Rojas MD   Hosp Day # 7 PCP Giuseppe Richardson DO       Subjective:    Lor Salazar Oral Daily   • hydrALAzine HCl  50 mg Oral Q8H [address]   • Heparin Sodium (Porcine)  5,000 Units Subcutaneous 2 times per day   • Insulin Aspart Pen  1-5 Units Subcutaneous TID CC   • sodium chloride   Intravenous Once   • Alfuzosin HCl ER  10 mg Oral Daily wi 37* 34* 32* 34* 39*   GFRNAA 32* 30* 28* 30* 33*   CA 8.3* 8.1* 8.6 8.6 8.4*   ALB 1.7* 1.6* 1.8*  --   --    * 137 138 137 138   K 4.3 4.8 4.5 5.1 4.5    108 108 109 108   CO2 19.0* 20.0* 21.0 18.0* 19.0*   ALKPHO 144*  --   --   --   --    AS hypokinesis  -cardiology consult pre op  -Echo     Acute renal failure, on admission Cr~2.6  H/o Chronic kidney disease stage 3. Baseline Cr~1.8  -Gentle hydration, monitor for now  -Renal consult, d/w Dr. Eduar Guaman     Diabetes mellitus type 2.   A1c 6.7  O

## 2019-06-22 NOTE — PLAN OF CARE
Problem: Patient Centered Care  Goal: Patient preferences are identified and integrated in the patient's plan of care  Description  Interventions:  - What would you like us to know as we care for you?  \"my foot hurts\"  - Provide timely, complete, and ac evaluate response  - Consider cultural and social influences on pain and pain management  - Manage/alleviate anxiety  - Utilize distraction and/or relaxation techniques  - Monitor for opioid side effects  - Notify MD/LIP if interventions unsuccessful or pa discharge planning if the patient needs post-hospital services based on physician/LIP order or complex needs related to functional status, cognitive ability or social support system  Outcome: Completed     Problem: SKIN/TISSUE INTEGRITY - ADULT  Goal: Skin

## 2019-06-22 NOTE — PROGRESS NOTES
[facility]    Progress Note      Subjective:     No new complaints    Review of Systems:   Constitutional: fatigue  Eyes: negative for irritation, redness and visual disturbance  Ears, nose, mouth, throat, and face: negative for hearing l injection SOLN 3,000 Units 3,000 Units Subcutaneous Once per day on Mon Wed Fri   0.9% NaCl infusion  Intravenous Once   amLODIPine Besylate (NORVASC) tab 10 mg 10 mg Oral Daily   hydrALAzine HCl (APRESOLINE) tab 50 mg 50 mg Oral Q8H [facility]   Normal Saline Fl 06/20/19  0730 06/21/19  0721 06/22/19  0945   RBC 3.14* 3.12* 3.08*   HGB 9.3* 9.2* 9.1*   HCT 28.2* 28.3* 28.7*   MCV 89.8 90.7 93.2   NEPRELIM 14.31* 11.58* 9.32*   WBC 16.7* 14.3* 11.7*   .0 376.0 375.0     Recent Labs   Lab 06/17/19  0724 06/18 gangrene   - arterial doppler showed ant.tibial and peroneal artery stenosis   - s/p aortogram and RLE angiogram and angioplasty on post.tibial artery with plan to return for ant.tibial intervention after surgical resection   - s/p BKA on 6/19    Discussed

## 2019-07-09 ENCOUNTER — SNF ADMIT/H&P (OUTPATIENT)
Dept: INTERNAL MEDICINE CLINIC | Facility: SKILLED NURSING FACILITY | Age: 69
End: 2019-07-09

## 2019-07-09 DIAGNOSIS — E11.69 TYPE 2 DIABETES MELLITUS WITH OTHER SPECIFIED COMPLICATION, WITHOUT LONG-TERM CURRENT USE OF INSULIN (HCC): ICD-10-CM

## 2019-07-09 DIAGNOSIS — R53.1 WEAKNESS: ICD-10-CM

## 2019-07-09 DIAGNOSIS — N18.30 CKD (CHRONIC KIDNEY DISEASE) STAGE 3, GFR 30-59 ML/MIN (HCC): ICD-10-CM

## 2019-07-09 DIAGNOSIS — Z89.511 STATUS POST BELOW KNEE AMPUTATION OF RIGHT LOWER EXTREMITY (HCC): ICD-10-CM

## 2019-07-09 DIAGNOSIS — I10 ESSENTIAL HYPERTENSION: ICD-10-CM

## 2019-07-09 PROCEDURE — 1123F ACP DISCUSS/DSCN MKR DOCD: CPT | Performed by: NURSE PRACTITIONER

## 2019-07-09 PROCEDURE — 99310 SBSQ NF CARE HIGH MDM 45: CPT | Performed by: NURSE PRACTITIONER

## 2019-07-09 NOTE — PROGRESS NOTES
HPI: Heron Thapa  Is a 78 yo male with known past medical history of coronary artery disease, diabetes mellitus type 2, and chronic renal insufficiency, with prolonged documented history of noncompliance who was admitted to [address] [place] [address] 6/14/19 with right foot ga Denies hematuria/dysuria/frequency. Reports he had a Bm in the last 24 hours.      PHYSICAL EXAM:  GENERAL HEALTH: NAD  HEENT: atraumatic/normocephalic, mucous membranes pink and moist, PERRL, EOMI, sclera anicteric, conjunctiva normal.   RESPIRATORY:CTAB type 2.  A1c 6.7 6/15/19  On 70/30 at home  -Control difficult because of patient erratic oral intake  -stopped mealtime insulin   -increased levemir to 10 + sliding scale inpatient   - follow BS in rehab      Anemia, suspect of chronic illness and kidney

## 2019-07-10 ENCOUNTER — EXTERNAL FACILITY (OUTPATIENT)
Dept: INTERNAL MEDICINE CLINIC | Facility: CLINIC | Age: 69
End: 2019-07-10

## 2019-07-10 ENCOUNTER — TELEPHONE (OUTPATIENT)
Dept: NEPHROLOGY | Facility: CLINIC | Age: 69
End: 2019-07-10

## 2019-07-10 DIAGNOSIS — I48.0 PAROXYSMAL ATRIAL FIBRILLATION (HCC): ICD-10-CM

## 2019-07-10 DIAGNOSIS — I96 GANGRENE OF RIGHT FOOT (HCC): ICD-10-CM

## 2019-07-10 DIAGNOSIS — Z89.511 S/P BKA (BELOW KNEE AMPUTATION) UNILATERAL, RIGHT (HCC): ICD-10-CM

## 2019-07-10 DIAGNOSIS — D64.9 ANEMIA, UNSPECIFIED TYPE: Primary | ICD-10-CM

## 2019-07-10 DIAGNOSIS — N18.30 CKD (CHRONIC KIDNEY DISEASE) STAGE 3, GFR 30-59 ML/MIN (HCC): ICD-10-CM

## 2019-07-10 PROCEDURE — 99306 1ST NF CARE HIGH MDM 50: CPT | Performed by: INTERNAL MEDICINE

## 2019-07-10 NOTE — TELEPHONE ENCOUNTER
Yobani/[facility] called to clarify order for Aranesp injection. He states pt saw Dr. London Mera in [address] [place] [address] and was discharged to [facility]. Please call.

## 2019-07-10 NOTE — TELEPHONE ENCOUNTER
Contacted Yobani at [facility]. Verbal orders for Aranesp 100 mcg SC every 2 weeks and labs for iron, TIBC, and ferritin given to Itzel Gage. Yobani read back correct orders.  Labs will be done tomorrow morning and results to be faxed to our office; fax # provided to

## 2019-07-10 NOTE — TELEPHONE ENCOUNTER
Contacted Yobani at [email or web address]. Pt was admitted to them yesterday 7/9/19. They have orders for Aranesp 40 mcg SC every 2 weeks. He would like to verify that this order is correct.  He is unsure of when his last injection would have been since he was just admi

## 2019-07-12 ENCOUNTER — EXTERNAL FACILITY (OUTPATIENT)
Dept: INTERNAL MEDICINE CLINIC | Facility: CLINIC | Age: 69
End: 2019-07-12

## 2019-07-12 DIAGNOSIS — K59.00 CONSTIPATION, UNSPECIFIED CONSTIPATION TYPE: ICD-10-CM

## 2019-07-12 DIAGNOSIS — I10 ESSENTIAL HYPERTENSION: ICD-10-CM

## 2019-07-12 DIAGNOSIS — Z89.511 S/P BKA (BELOW KNEE AMPUTATION) UNILATERAL, RIGHT (HCC): ICD-10-CM

## 2019-07-12 PROCEDURE — 99308 SBSQ NF CARE LOW MDM 20: CPT | Performed by: INTERNAL MEDICINE

## 2019-07-15 ENCOUNTER — EXTERNAL FACILITY (OUTPATIENT)
Dept: INTERNAL MEDICINE CLINIC | Facility: CLINIC | Age: 69
End: 2019-07-15

## 2019-07-15 ENCOUNTER — TELEPHONE (OUTPATIENT)
Dept: NEPHROLOGY | Facility: CLINIC | Age: 69
End: 2019-07-15

## 2019-07-15 DIAGNOSIS — I10 ESSENTIAL HYPERTENSION: ICD-10-CM

## 2019-07-15 DIAGNOSIS — K59.00 CONSTIPATION, UNSPECIFIED CONSTIPATION TYPE: ICD-10-CM

## 2019-07-15 DIAGNOSIS — I50.812 CHRONIC RIGHT-SIDED HEART FAILURE (HCC): ICD-10-CM

## 2019-07-15 DIAGNOSIS — I50.33 ACUTE ON CHRONIC DIASTOLIC CONGESTIVE HEART FAILURE (HCC): ICD-10-CM

## 2019-07-15 DIAGNOSIS — I25.10 CORONARY ARTERY DISEASE INVOLVING NATIVE CORONARY ARTERY OF NATIVE HEART WITHOUT ANGINA PECTORIS: ICD-10-CM

## 2019-07-15 PROCEDURE — 99309 SBSQ NF CARE MODERATE MDM 30: CPT | Performed by: INTERNAL MEDICINE

## 2019-07-15 NOTE — TELEPHONE ENCOUNTER
Per Dr. Get Snell not on iron, start Ferrous Sulfate 325 mg 1 tablet daily. Results faxed to nursing staff at [facility].  2nd floor skilled

## 2019-07-16 ENCOUNTER — SNF VISIT (OUTPATIENT)
Dept: INTERNAL MEDICINE CLINIC | Facility: SKILLED NURSING FACILITY | Age: 69
End: 2019-07-16

## 2019-07-16 DIAGNOSIS — E11.69 TYPE 2 DIABETES MELLITUS WITH OTHER SPECIFIED COMPLICATION, WITHOUT LONG-TERM CURRENT USE OF INSULIN (HCC): ICD-10-CM

## 2019-07-16 DIAGNOSIS — I10 ESSENTIAL HYPERTENSION: ICD-10-CM

## 2019-07-16 DIAGNOSIS — Z89.511 S/P BKA (BELOW KNEE AMPUTATION) UNILATERAL, RIGHT (HCC): ICD-10-CM

## 2019-07-16 DIAGNOSIS — N18.30 CKD (CHRONIC KIDNEY DISEASE) STAGE 3, GFR 30-59 ML/MIN (HCC): ICD-10-CM

## 2019-07-16 PROCEDURE — 99308 SBSQ NF CARE LOW MDM 20: CPT | Performed by: NURSE PRACTITIONER

## 2019-07-16 NOTE — PROGRESS NOTES
HPI: Mami Shah  Is a 78 yo male with known past medical history of coronary artery disease, diabetes mellitus type 2, and chronic renal insufficiency, with prolonged documented history of noncompliance who was admitted to [facility] 6/14/19 with right foot ga conjunctiva normal.   RESPIRATORY:CTAB  CARDIOVASCULAR:  S1S2 RRR  ABDOMEN:  normal active BS+, soft, non distended, nontender   MUSCULOSKELETAL/EXTREMITIES:  R BKA, no edema  SKIN: warm, dry  WOUND: R BKA incision with intact steri strips, no redness/drai [id number] and iron supp     H/o ADHD  Cont present management, psych consult in rehab if needed     Noncompliance with follow-up, as well as diet  -Dietitian in rehab following  - diabetic diet

## 2019-07-17 ENCOUNTER — EXTERNAL FACILITY (OUTPATIENT)
Dept: INTERNAL MEDICINE CLINIC | Facility: CLINIC | Age: 69
End: 2019-07-17

## 2019-07-17 DIAGNOSIS — I48.91 ATRIAL FIBRILLATION, UNSPECIFIED TYPE (HCC): ICD-10-CM

## 2019-07-17 DIAGNOSIS — I50.812 CHRONIC RIGHT-SIDED HEART FAILURE (HCC): ICD-10-CM

## 2019-07-17 DIAGNOSIS — I10 ESSENTIAL HYPERTENSION: ICD-10-CM

## 2019-07-17 PROCEDURE — 99308 SBSQ NF CARE LOW MDM 20: CPT | Performed by: INTERNAL MEDICINE

## 2019-07-19 ENCOUNTER — EXTERNAL FACILITY (OUTPATIENT)
Dept: INTERNAL MEDICINE CLINIC | Facility: CLINIC | Age: 69
End: 2019-07-19

## 2019-07-19 DIAGNOSIS — N18.30 CKD (CHRONIC KIDNEY DISEASE) STAGE 3, GFR 30-59 ML/MIN (HCC): ICD-10-CM

## 2019-07-19 DIAGNOSIS — I48.0 PAROXYSMAL ATRIAL FIBRILLATION (HCC): ICD-10-CM

## 2019-07-19 DIAGNOSIS — I50.33 ACUTE ON CHRONIC DIASTOLIC CONGESTIVE HEART FAILURE (HCC): ICD-10-CM

## 2019-07-19 PROCEDURE — 99307 SBSQ NF CARE SF MDM 10: CPT | Performed by: INTERNAL MEDICINE

## 2019-07-22 PROCEDURE — 99307 SBSQ NF CARE SF MDM 10: CPT | Performed by: INTERNAL MEDICINE

## 2019-07-23 ENCOUNTER — EXTERNAL FACILITY (OUTPATIENT)
Dept: INTERNAL MEDICINE CLINIC | Facility: CLINIC | Age: 69
End: 2019-07-23

## 2019-07-23 ENCOUNTER — HOSPITAL ENCOUNTER (EMERGENCY)
Facility: HOSPITAL | Age: 69
Discharge: HOME | End: 2019-07-24
Attending: EMERGENCY MEDICINE
Payer: MEDICARE

## 2019-07-23 VITALS
TEMPERATURE: 98 F | BODY MASS INDEX: 20.46 KG/M2 | OXYGEN SATURATION: 98 % | RESPIRATION RATE: 20 BRPM | HEART RATE: 56 BPM | DIASTOLIC BLOOD PRESSURE: 91 MMHG | WEIGHT: 135 LBS | SYSTOLIC BLOOD PRESSURE: 131 MMHG | HEIGHT: 68 IN
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
## 2019-07-23 DIAGNOSIS — I50.812 CHRONIC RIGHT-SIDED HEART FAILURE (HCC): ICD-10-CM

## 2019-07-23 DIAGNOSIS — I48.91 ATRIAL FIBRILLATION, UNSPECIFIED TYPE (HCC): ICD-10-CM

## 2019-07-23 DIAGNOSIS — T81.30XA WOUND DEHISCENCE: Primary | ICD-10-CM

## 2019-07-23 DIAGNOSIS — N18.30 CKD (CHRONIC KIDNEY DISEASE) STAGE 3, GFR 30-59 ML/MIN (HCC): ICD-10-CM

## 2019-07-23 LAB
ANION GAP SERPL CALC-SCNC: 8 MMOL/L (ref 0–18)
BASOPHILS # BLD AUTO: 0.04 X10(3) UL (ref 0–0.2)
BASOPHILS NFR BLD AUTO: 0.4 %
BUN BLD-MCNC: 29 MG/DL (ref 7–18)
BUN/CREAT SERPL: 17.5 (ref 10–20)
CALCIUM BLD-MCNC: 8.5 MG/DL (ref 8.5–10.1)
CHLORIDE SERPL-SCNC: 107 MMOL/L (ref 98–112)
CO2 SERPL-SCNC: 24 MMOL/L (ref 21–32)
CREAT BLD-MCNC: 1.66 MG/DL (ref 0.7–1.3)
DEPRECATED RDW RBC AUTO: 49.1 FL (ref 35.1–46.3)
EOSINOPHIL # BLD AUTO: 0.07 X10(3) UL (ref 0–0.7)
EOSINOPHIL NFR BLD AUTO: 0.8 %
ERYTHROCYTE [DISTWIDTH] IN BLOOD BY AUTOMATED COUNT: 14.4 % (ref 11–15)
GLUCOSE BLD-MCNC: 147 MG/DL (ref 70–99)
HCT VFR BLD AUTO: 37.2 % (ref 39–53)
HGB BLD-MCNC: 12.3 G/DL (ref 13–17.5)
IMM GRANULOCYTES # BLD AUTO: 0.02 X10(3) UL (ref 0–1)
IMM GRANULOCYTES NFR BLD: 0.2 %
LYMPHOCYTES # BLD AUTO: 3.94 X10(3) UL (ref 1–4)
LYMPHOCYTES NFR BLD AUTO: 43.9 %
MCH RBC QN AUTO: 30.4 PG (ref 26–34)
MCHC RBC AUTO-ENTMCNC: 33.1 G/DL (ref 31–37)
MCV RBC AUTO: 91.9 FL (ref 80–100)
MONOCYTES # BLD AUTO: 0.7 X10(3) UL (ref 0.1–1)
MONOCYTES NFR BLD AUTO: 7.8 %
NEUTROPHILS # BLD AUTO: 4.21 X10 (3) UL (ref 1.5–7.7)
NEUTROPHILS # BLD AUTO: 4.21 X10(3) UL (ref 1.5–7.7)
NEUTROPHILS NFR BLD AUTO: 46.9 %
OSMOLALITY SERPL CALC.SUM OF ELEC: 297 MOSM/KG (ref 275–295)
PLATELET # BLD AUTO: 245 10(3)UL (ref 150–450)
POTASSIUM SERPL-SCNC: 3.7 MMOL/L (ref 3.5–5.1)
RBC # BLD AUTO: 4.05 X10(6)UL (ref 3.8–5.8)
SODIUM SERPL-SCNC: 139 MMOL/L (ref 136–145)
WBC # BLD AUTO: 9 X10(3) UL (ref 4–11)

## 2019-07-23 PROCEDURE — 85025 COMPLETE CBC W/AUTO DIFF WBC: CPT | Performed by: EMERGENCY MEDICINE

## 2019-07-23 PROCEDURE — 80048 BASIC METABOLIC PNL TOTAL CA: CPT | Performed by: EMERGENCY MEDICINE

## 2019-07-23 PROCEDURE — 12002 RPR S/N/AX/GEN/TRNK2.6-7.5CM: CPT

## 2019-07-23 PROCEDURE — 99283 EMERGENCY DEPT VISIT LOW MDM: CPT

## 2019-07-23 PROCEDURE — 36415 COLL VENOUS BLD VENIPUNCTURE: CPT

## 2019-07-23 RX ORDER — HYDROCODONE BITARTRATE AND ACETAMINOPHEN 5; 325 MG/1; MG/1
1 TABLET ORAL ONCE
Status: COMPLETED | OUTPATIENT
Start: 2019-07-23 | End: 2019-07-23

## 2019-07-24 ENCOUNTER — TELEPHONE (OUTPATIENT)
Dept: FAMILY MEDICINE CLINIC | Facility: CLINIC | Age: 69
End: 2019-07-24

## 2019-07-24 NOTE — ED PROVIDER NOTES
Patient Seen in: [facility] AND [facility] Emergency Department    History   Patient presents with:  Fall (musculoskeletal, neurologic)    Stated Complaint: fall on bka    HPI    History is provided by EMS and patient.     70-year-old male with history of diabete Cardiovascular: Negative for chest pain. Gastrointestinal: Negative for abdominal pain, diarrhea, nausea and vomiting. Genitourinary: Negative for dysuria, flank pain and frequency. Musculoskeletal: Negative for back pain.    Skin: Positive for woun facial asymmetry, normal speech     Skin: Skin is warm and dry. No rash noted. He is not diaphoretic. Psychiatric: He has a normal mood and affect. Nursing note and vitals reviewed.           ED Course     Pulse Oximeter:  Pulse oximetry on room air is 275 - 295 mOsm/kg    GFR, Non- 42 (L) >=60    GFR, -American 48 (L) >=60   CBC W/ DIFFERENTIAL    Collection Time: 07/23/19 11:00 PM   Result Value Ref Range    WBC 9.0 4.0 - 11.0 x10(3) uL    RBC 4.05 3.80 - 5.80 x10(6)uL    HGB 12. pressure re-checked within 1 week was provided. Medical Record Review: I personally reviewed available prior medical records for any recent pertinent discharge summaries, testing, and procedures, and reviewed those reports. Complicating Factors:  The

## 2019-07-24 NOTE — TELEPHONE ENCOUNTER
Per Eve pt was opened for home health today. Will have nurse, physical therapy starting this week and occupational therapy starting next week.

## 2019-07-24 NOTE — ED INITIAL ASSESSMENT (HPI)
Recently DC from [facility] s/p right bka and was attempting to go to the bathroom and fell on stump. Denies hitting head or loc.  On blood thinners

## 2019-07-25 ENCOUNTER — TELEPHONE (OUTPATIENT)
Dept: FAMILY MEDICINE CLINIC | Facility: CLINIC | Age: 69
End: 2019-07-25

## 2019-07-25 ENCOUNTER — TELEPHONE (OUTPATIENT)
Dept: OTHER | Age: 69
End: 2019-07-25

## 2019-07-25 NOTE — TELEPHONE ENCOUNTER
Tim Yusuf, [facility] calling to relay plan of care, no need to call back unless there are changes. Patient post rehab from [facility] had a fall at home 7/23/19 seen in ER for wound dehiscence, staples applied.  PT initiated 7/24/19, goals safety, restore

## 2019-07-25 NOTE — TELEPHONE ENCOUNTER
Spoke to orberto carlos who is with patient. Daughter asked if vials can be sent. Patient uses vials and syringes. Not the pens. No syringes are needed at this time per daughter. I spoke to Dario Kimball  They said patient was recently discharged with

## 2019-07-25 NOTE — TELEPHONE ENCOUNTER
Daughter is calling as patient had a recent amputation BKA right leg. He was in [facility] and released this week. They took him home and he attempted to get up on his own and fell.  This caused the staples/ incision to break open so they b

## 2019-07-26 ENCOUNTER — OFFICE VISIT (OUTPATIENT)
Dept: FAMILY MEDICINE CLINIC | Facility: CLINIC | Age: 69
End: 2019-07-26
Payer: MEDICARE

## 2019-07-26 VITALS
DIASTOLIC BLOOD PRESSURE: 71 MMHG | HEART RATE: 65 BPM | HEIGHT: 68 IN | SYSTOLIC BLOOD PRESSURE: 150 MMHG | TEMPERATURE: 98 F | BODY MASS INDEX: 21 KG/M2

## 2019-07-26 DIAGNOSIS — I25.10 CORONARY ARTERY DISEASE INVOLVING NATIVE CORONARY ARTERY OF NATIVE HEART WITHOUT ANGINA PECTORIS: Primary | ICD-10-CM

## 2019-07-26 DIAGNOSIS — Z89.511 HX OF BKA, RIGHT (HCC): ICD-10-CM

## 2019-07-26 DIAGNOSIS — I48.91 ATRIAL FIBRILLATION, UNSPECIFIED TYPE (HCC): ICD-10-CM

## 2019-07-26 PROBLEM — R79.89 ELEVATED TROPONIN: Status: RESOLVED | Noted: 2018-10-24 | Resolved: 2019-07-26

## 2019-07-26 PROBLEM — R73.9 HYPERGLYCEMIA: Status: RESOLVED | Noted: 2018-10-24 | Resolved: 2019-07-26

## 2019-07-26 PROBLEM — I96 GANGRENE OF RIGHT FOOT (HCC): Status: RESOLVED | Noted: 2019-06-14 | Resolved: 2019-07-26

## 2019-07-26 PROBLEM — R77.8 ELEVATED TROPONIN: Status: RESOLVED | Noted: 2018-10-24 | Resolved: 2019-07-26

## 2019-07-26 PROCEDURE — G0463 HOSPITAL OUTPT CLINIC VISIT: HCPCS | Performed by: FAMILY MEDICINE

## 2019-07-26 PROCEDURE — 99214 OFFICE O/P EST MOD 30 MIN: CPT | Performed by: FAMILY MEDICINE

## 2019-07-26 RX ORDER — ATORVASTATIN CALCIUM 20 MG/1
20 TABLET, FILM COATED ORAL NIGHTLY
Qty: 30 TABLET | Refills: 5 | Status: SHIPPED | OUTPATIENT
Start: 2019-07-26 | End: 2020-02-04

## 2019-07-26 RX ORDER — ALFUZOSIN HYDROCHLORIDE 10 MG/1
TABLET, EXTENDED RELEASE ORAL
Qty: 30 TABLET | Refills: 5 | Status: SHIPPED | OUTPATIENT
Start: 2019-07-26 | End: 2020-02-04

## 2019-07-26 RX ORDER — HYDROCODONE BITARTRATE AND ACETAMINOPHEN 5; 325 MG/1; MG/1
1 TABLET ORAL EVERY 6 HOURS PRN
Qty: 30 TABLET | Refills: 0 | Status: SHIPPED | OUTPATIENT
Start: 2019-07-26 | End: 2020-02-04 | Stop reason: ALTCHOICE

## 2019-07-26 RX ORDER — AMLODIPINE BESYLATE 10 MG/1
10 TABLET ORAL
Qty: 30 TABLET | Refills: 5 | Status: SHIPPED | OUTPATIENT
Start: 2019-07-26 | End: 2020-02-04

## 2019-07-26 RX ORDER — METOPROLOL SUCCINATE 50 MG/1
50 TABLET, EXTENDED RELEASE ORAL
Qty: 30 TABLET | Refills: 5 | Status: SHIPPED | OUTPATIENT
Start: 2019-07-26 | End: 2020-01-28

## 2019-07-26 RX ORDER — FUROSEMIDE 40 MG/1
40 TABLET ORAL DAILY
Qty: 30 TABLET | Refills: 5 | Status: SHIPPED | OUTPATIENT
Start: 2019-07-26 | End: 2020-02-04

## 2019-07-26 RX ORDER — HYDRALAZINE HYDROCHLORIDE 50 MG/1
50 TABLET, FILM COATED ORAL EVERY 8 HOURS SCHEDULED
Qty: 270 TABLET | Refills: 5 | Status: SHIPPED | OUTPATIENT
Start: 2019-07-26 | End: 2020-02-04

## 2019-07-26 NOTE — PROGRESS NOTES
HPI:    Patient ID: Ledon Simmonds is a 77year old male.     HPI  Patient presents with:  Trauma (cardiovascular, musculoskeletal): RT leg amputation   Medication Request: medication with clear instructions for insulin lantus, has not had prescription due t 10 MG Rectal Suppos Place 1 suppository (10 mg total) rectally 4 (four) times daily as needed. Disp:  Rfl: 0   famoTIDine 10 MG Oral Tab Take 1 tablet (10 mg total) by mouth 2 (two) times daily as needed for Heartburn.  Disp:  Rfl: 0   lactulose 10 GM/15ML HYDROcodone-acetaminophen 5-325 MG Oral Tab 30 tablet 0     Sig: Take 1 tablet by mouth every 6 (six) hours as needed. • Metoprolol Succinate ER 50 MG Oral Tablet 24 Hr 30 tablet 5     Sig: Take 1 tablet (50 mg total) by mouth Daily Beta Blocker.    • hyd

## 2019-07-27 ENCOUNTER — TELEPHONE (OUTPATIENT)
Dept: FAMILY MEDICINE CLINIC | Facility: CLINIC | Age: 69
End: 2019-07-27

## 2019-07-27 NOTE — TELEPHONE ENCOUNTER
On call note: Was called on 7/27/19 by grand daughter that they have had a hard time using lantus and would like to back to novolin insulin. They still have previous medication and would prefer using this then switching to lantus.  Approved going back to [facility]

## 2019-08-01 ENCOUNTER — TELEPHONE (OUTPATIENT)
Dept: FAMILY MEDICINE CLINIC | Facility: CLINIC | Age: 69
End: 2019-08-01

## 2019-08-01 NOTE — TELEPHONE ENCOUNTER
Per Tanya/MARIA ESTHER, pt evaluated for OT yesterday. Treatment will be ADL transfer; fall prevention; energy conservation, endurance and upper extremities exercise, frequency 1W1 and 2W2, if pt oxygen will be below 90% Tanya/MARIA ESTHER will call office.

## 2019-08-09 ENCOUNTER — NURSE TRIAGE (OUTPATIENT)
Dept: OTHER | Age: 69
End: 2019-08-09

## 2019-08-09 NOTE — TELEPHONE ENCOUNTER
Spoke to patient's daughter, with wife in background (on JENNY), and informed them of Dr. Allison Moses message. Patient's spouse and daughter verbalized understanding and appreciation.

## 2019-08-09 NOTE — TELEPHONE ENCOUNTER
Action Requested: Summary for Provider     []  Critical Lab, Recommendations Needed  [] Need Additional Advice  []   FYI    []   Need Orders  [] Need Medications Sent to Pharmacy  []  Other     SUMMARY: Spouse report cough x 2 days. Worse today.  Associate

## 2019-08-10 ENCOUNTER — HOSPITAL ENCOUNTER (EMERGENCY)
Facility: HOSPITAL | Age: 69
Discharge: HOME | End: 2019-08-10
Attending: EMERGENCY MEDICINE
Payer: MEDICARE

## 2019-08-10 ENCOUNTER — APPOINTMENT (OUTPATIENT)
Dept: GENERAL RADIOLOGY | Facility: HOSPITAL | Age: 69
End: 2019-08-10
Attending: EMERGENCY MEDICINE
Payer: MEDICARE

## 2019-08-10 VITALS
WEIGHT: 135 LBS | BODY MASS INDEX: 20.46 KG/M2 | DIASTOLIC BLOOD PRESSURE: 59 MMHG | HEART RATE: 66 BPM | SYSTOLIC BLOOD PRESSURE: 123 MMHG | HEIGHT: 68 IN | OXYGEN SATURATION: 97 % | TEMPERATURE: 99 F | RESPIRATION RATE: 26 BRPM

## 2019-08-10 DIAGNOSIS — R05.9 COUGH: Primary | ICD-10-CM
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
## 2019-08-10 DIAGNOSIS — T14.8XXA BLEEDING FROM WOUND: ICD-10-CM

## 2019-08-10 PROCEDURE — 99283 EMERGENCY DEPT VISIT LOW MDM: CPT

## 2019-08-10 PROCEDURE — 71045 X-RAY EXAM CHEST 1 VIEW: CPT | Performed by: EMERGENCY MEDICINE

## 2019-08-10 NOTE — CM/SW NOTE
Filiberto Alfaro called requesting this RN to complete PCS form for patient's discharge via [address] PCS form completed - [facility], copy given to registration and second copy placed in [facility] office in completed PCS file.

## 2019-08-10 NOTE — ED INITIAL ASSESSMENT (HPI)
Pt fell around 0030 last night, had BKA on R leg last month (early July) at this facility, and had staples placed to site in late July after a fall.  Patient presents tonight for bleeding to the site of the BKA s/p fall d/t weakness- states \"my leg gave ou

## 2019-08-10 NOTE — ED PROVIDER NOTES
Patient Seen in: [facility] AND [facility] Emergency Department    History   Patient presents with:  Fall (musculoskeletal, neurologic)  Bleeding (hematologic)    Stated Complaint: fall, BKA last month, bleeding from stapled site    HPI    44-year-old male with Systems   Constitutional: Negative for appetite change, fatigue and fever. HENT: Negative for congestion, ear pain and sore throat. Eyes: Negative for pain, discharge and redness. Respiratory: Negative for cough, shortness of breath and wheezing. right BKA with staples in the central aspect of the wound in place, 2 cm central area of the incision bleeding, wound edges well approximated, skin intact   Neurological: He is alert and oriented to person, place, and time.    5/5 strength in b/l UEs and LE reviewed available prior medical records for any recent pertinent discharge summaries, testing, and procedures, and reviewed those reports. Complicating Factors: The patient already has does not have any pertinent problems on file.  to contribute to the

## 2019-08-15 NOTE — TELEPHONE ENCOUNTER
[facility] is requesting a verbal for OT due to pt missing a visit.  Wants to have one more  visit of the week of the 19th     Please advise

## 2019-08-19 NOTE — TELEPHONE ENCOUNTER
Akhil Dacosta from [facility] calling back and she was inform of  message below . Thanks

## 2019-08-21 ENCOUNTER — TELEPHONE (OUTPATIENT)
Dept: FAMILY MEDICINE CLINIC | Facility: CLINIC | Age: 69
End: 2019-08-21

## 2019-08-21 NOTE — TELEPHONE ENCOUNTER
Per Naya/PT, she would like extend one wk 2 starting the medicare wk of 08/25/2019. Need verbal Order.

## 2019-09-30 ENCOUNTER — MED REC SCAN ONLY (OUTPATIENT)
Dept: FAMILY MEDICINE CLINIC | Facility: CLINIC | Age: 69
End: 2019-09-30

## 2019-10-01 ENCOUNTER — MED REC SCAN ONLY (OUTPATIENT)
Dept: FAMILY MEDICINE CLINIC | Facility: CLINIC | Age: 69
End: 2019-10-01

## 2019-10-04 ENCOUNTER — HOME HEALTH CHARGES (OUTPATIENT)
Dept: FAMILY MEDICINE CLINIC | Facility: CLINIC | Age: 69
End: 2019-10-04

## 2019-10-04 DIAGNOSIS — N18.30 TYPE 2 DIABETES MELLITUS WITH STAGE 3 CHRONIC KIDNEY DISEASE, WITH LONG-TERM CURRENT USE OF INSULIN (HCC): ICD-10-CM

## 2019-10-04 DIAGNOSIS — E11.22 TYPE 2 DIABETES MELLITUS WITH STAGE 3 CHRONIC KIDNEY DISEASE, WITH LONG-TERM CURRENT USE OF INSULIN (HCC): ICD-10-CM

## 2019-10-04 DIAGNOSIS — Z89.511 HX OF BKA, RIGHT (HCC): ICD-10-CM

## 2019-10-04 DIAGNOSIS — I48.20 CHRONIC ATRIAL FIBRILLATION (HCC): ICD-10-CM

## 2019-10-04 DIAGNOSIS — I50.33 ACUTE ON CHRONIC DIASTOLIC CONGESTIVE HEART FAILURE (HCC): ICD-10-CM

## 2019-10-04 DIAGNOSIS — Z79.4 TYPE 2 DIABETES MELLITUS WITH STAGE 3 CHRONIC KIDNEY DISEASE, WITH LONG-TERM CURRENT USE OF INSULIN (HCC): ICD-10-CM

## 2019-10-04 PROCEDURE — G0180 MD CERTIFICATION HHA PATIENT: HCPCS | Performed by: FAMILY MEDICINE

## 2020-01-29 RX ORDER — METOPROLOL SUCCINATE 50 MG/1
TABLET, EXTENDED RELEASE ORAL
Qty: 30 TABLET | Refills: 0 | Status: SHIPPED | OUTPATIENT
Start: 2020-01-29 | End: 2020-02-04

## 2020-01-29 NOTE — TELEPHONE ENCOUNTER
Refill noted. Chart reveiwed. Okay to fill times one for 30 day supply with no additional refills. Patient needs a physical with Dr. Pauline Wade for further refills.

## 2020-01-29 NOTE — TELEPHONE ENCOUNTER
Please review; protocol failed.      Requested Prescriptions     Pending Prescriptions Disp Refills   • METOPROLOL SUCCINATE ER 50 MG Oral Tablet 24 Hr [Pharmacy Med Name: Metoprolol Succinate ER 50 MG Oral Tablet Extended Release 24 Hour] 30 tablet 0     S

## 2020-02-04 ENCOUNTER — LAB ENCOUNTER (OUTPATIENT)
Dept: LAB | Age: 70
End: 2020-02-04
Attending: FAMILY MEDICINE
Payer: MEDICARE

## 2020-02-04 ENCOUNTER — OFFICE VISIT (OUTPATIENT)
Dept: FAMILY MEDICINE CLINIC | Facility: CLINIC | Age: 70
End: 2020-02-04
Payer: MEDICARE

## 2020-02-04 VITALS
BODY MASS INDEX: 21 KG/M2 | HEART RATE: 74 BPM | SYSTOLIC BLOOD PRESSURE: 151 MMHG | DIASTOLIC BLOOD PRESSURE: 68 MMHG | TEMPERATURE: 97 F | HEIGHT: 68 IN

## 2020-02-04 DIAGNOSIS — Z12.5 PROSTATE CANCER SCREENING: ICD-10-CM

## 2020-02-04 DIAGNOSIS — I48.20 CHRONIC ATRIAL FIBRILLATION (HCC): ICD-10-CM

## 2020-02-04 DIAGNOSIS — N18.30 TYPE 2 DIABETES MELLITUS WITH STAGE 3 CHRONIC KIDNEY DISEASE, WITH LONG-TERM CURRENT USE OF INSULIN (HCC): Primary | ICD-10-CM

## 2020-02-04 DIAGNOSIS — Z79.4 TYPE 2 DIABETES MELLITUS WITH STAGE 3 CHRONIC KIDNEY DISEASE, WITH LONG-TERM CURRENT USE OF INSULIN (HCC): Primary | ICD-10-CM

## 2020-02-04 DIAGNOSIS — E11.22 TYPE 2 DIABETES MELLITUS WITH STAGE 3 CHRONIC KIDNEY DISEASE, WITH LONG-TERM CURRENT USE OF INSULIN (HCC): Primary | ICD-10-CM

## 2020-02-04 DIAGNOSIS — E11.42 DIABETIC POLYNEUROPATHY ASSOCIATED WITH TYPE 2 DIABETES MELLITUS (HCC): ICD-10-CM

## 2020-02-04 DIAGNOSIS — E11.22 TYPE 2 DIABETES MELLITUS WITH STAGE 3 CHRONIC KIDNEY DISEASE, WITH LONG-TERM CURRENT USE OF INSULIN (HCC): ICD-10-CM

## 2020-02-04 DIAGNOSIS — Z89.511 HX OF BKA, RIGHT (HCC): ICD-10-CM

## 2020-02-04 DIAGNOSIS — Z79.4 TYPE 2 DIABETES MELLITUS WITH STAGE 3 CHRONIC KIDNEY DISEASE, WITH LONG-TERM CURRENT USE OF INSULIN (HCC): ICD-10-CM

## 2020-02-04 DIAGNOSIS — N18.30 TYPE 2 DIABETES MELLITUS WITH STAGE 3 CHRONIC KIDNEY DISEASE, WITH LONG-TERM CURRENT USE OF INSULIN (HCC): ICD-10-CM

## 2020-02-04 LAB
ALBUMIN SERPL-MCNC: 3.7 G/DL (ref 3.4–5)
ALBUMIN/GLOB SERPL: 1 {RATIO} (ref 1–2)
ALP LIVER SERPL-CCNC: 183 U/L (ref 45–117)
ALT SERPL-CCNC: 19 U/L (ref 16–61)
ANION GAP SERPL CALC-SCNC: 8 MMOL/L (ref 0–18)
AST SERPL-CCNC: 14 U/L (ref 15–37)
BASOPHILS # BLD AUTO: 0.04 X10(3) UL (ref 0–0.2)
BASOPHILS NFR BLD AUTO: 0.5 %
BILIRUB SERPL-MCNC: 0.4 MG/DL (ref 0.1–2)
BUN BLD-MCNC: 30 MG/DL (ref 7–18)
BUN/CREAT SERPL: 13.6 (ref 10–20)
CALCIUM BLD-MCNC: 8.8 MG/DL (ref 8.5–10.1)
CHLORIDE SERPL-SCNC: 111 MMOL/L (ref 98–112)
CHOLEST SMN-MCNC: 107 MG/DL (ref ?–200)
CO2 SERPL-SCNC: 22 MMOL/L (ref 21–32)
COMPLEXED PSA SERPL-MCNC: 3.52 NG/ML (ref ?–4)
CREAT BLD-MCNC: 2.21 MG/DL (ref 0.7–1.3)
DEPRECATED RDW RBC AUTO: 40.8 FL (ref 35.1–46.3)
EOSINOPHIL # BLD AUTO: 0.05 X10(3) UL (ref 0–0.7)
EOSINOPHIL NFR BLD AUTO: 0.7 %
ERYTHROCYTE [DISTWIDTH] IN BLOOD BY AUTOMATED COUNT: 12.4 % (ref 11–15)
GLOBULIN PLAS-MCNC: 3.7 G/DL (ref 2.8–4.4)
GLUCOSE BLD-MCNC: 72 MG/DL (ref 70–99)
HCT VFR BLD AUTO: 29.9 % (ref 39–53)
HDLC SERPL-MCNC: 47 MG/DL (ref 40–59)
HGB BLD-MCNC: 10.1 G/DL (ref 13–17.5)
IMM GRANULOCYTES # BLD AUTO: 0.02 X10(3) UL (ref 0–1)
IMM GRANULOCYTES NFR BLD: 0.3 %
LDLC SERPL CALC-MCNC: 48 MG/DL (ref ?–100)
LYMPHOCYTES # BLD AUTO: 2.02 X10(3) UL (ref 1–4)
LYMPHOCYTES NFR BLD AUTO: 26.6 %
M PROTEIN MFR SERPL ELPH: 7.4 G/DL (ref 6.4–8.2)
MCH RBC QN AUTO: 30.3 PG (ref 26–34)
MCHC RBC AUTO-ENTMCNC: 33.8 G/DL (ref 31–37)
MCV RBC AUTO: 89.8 FL (ref 80–100)
MONOCYTES # BLD AUTO: 0.54 X10(3) UL (ref 0.1–1)
MONOCYTES NFR BLD AUTO: 7.1 %
NEUTROPHILS # BLD AUTO: 4.92 X10 (3) UL (ref 1.5–7.7)
NEUTROPHILS # BLD AUTO: 4.92 X10(3) UL (ref 1.5–7.7)
NEUTROPHILS NFR BLD AUTO: 64.8 %
NONHDLC SERPL-MCNC: 60 MG/DL (ref ?–130)
OSMOLALITY SERPL CALC.SUM OF ELEC: 297 MOSM/KG (ref 275–295)
PATIENT FASTING Y/N/NP: NO
PATIENT FASTING Y/N/NP: NO
PLATELET # BLD AUTO: 223 10(3)UL (ref 150–450)
POTASSIUM SERPL-SCNC: 4.3 MMOL/L (ref 3.5–5.1)
RBC # BLD AUTO: 3.33 X10(6)UL (ref 3.8–5.8)
SODIUM SERPL-SCNC: 141 MMOL/L (ref 136–145)
TRIGL SERPL-MCNC: 59 MG/DL (ref 30–149)
VLDLC SERPL CALC-MCNC: 12 MG/DL (ref 0–30)
WBC # BLD AUTO: 7.6 X10(3) UL (ref 4–11)

## 2020-02-04 PROCEDURE — 80061 LIPID PANEL: CPT

## 2020-02-04 PROCEDURE — 36415 COLL VENOUS BLD VENIPUNCTURE: CPT

## 2020-02-04 PROCEDURE — 85025 COMPLETE CBC W/AUTO DIFF WBC: CPT

## 2020-02-04 PROCEDURE — 83036 HEMOGLOBIN GLYCOSYLATED A1C: CPT

## 2020-02-04 PROCEDURE — 80053 COMPREHEN METABOLIC PANEL: CPT

## 2020-02-04 PROCEDURE — 99214 OFFICE O/P EST MOD 30 MIN: CPT | Performed by: FAMILY MEDICINE

## 2020-02-04 PROCEDURE — G0463 HOSPITAL OUTPT CLINIC VISIT: HCPCS | Performed by: FAMILY MEDICINE

## 2020-02-04 RX ORDER — ATORVASTATIN CALCIUM 20 MG/1
20 TABLET, FILM COATED ORAL NIGHTLY
Qty: 90 TABLET | Refills: 1 | Status: SHIPPED | OUTPATIENT
Start: 2020-02-04 | End: 2020-05-04

## 2020-02-04 RX ORDER — FUROSEMIDE 40 MG/1
40 TABLET ORAL DAILY
Qty: 90 TABLET | Refills: 1 | Status: SHIPPED | OUTPATIENT
Start: 2020-02-04 | End: 2020-05-04

## 2020-02-04 RX ORDER — METOPROLOL SUCCINATE 50 MG/1
TABLET, EXTENDED RELEASE ORAL
Qty: 90 TABLET | Refills: 1 | Status: SHIPPED | OUTPATIENT
Start: 2020-02-04 | End: 2020-05-04

## 2020-02-04 RX ORDER — HYDRALAZINE HYDROCHLORIDE 50 MG/1
50 TABLET, FILM COATED ORAL EVERY 8 HOURS SCHEDULED
Qty: 270 TABLET | Refills: 1 | Status: SHIPPED | OUTPATIENT
Start: 2020-02-04 | End: 2020-05-04

## 2020-02-04 RX ORDER — ALFUZOSIN HYDROCHLORIDE 10 MG/1
TABLET, EXTENDED RELEASE ORAL
Qty: 90 TABLET | Refills: 1 | Status: SHIPPED | OUTPATIENT
Start: 2020-02-04 | End: 2020-05-04

## 2020-02-04 RX ORDER — AMLODIPINE BESYLATE 10 MG/1
10 TABLET ORAL
Qty: 90 TABLET | Refills: 1 | Status: SHIPPED | OUTPATIENT
Start: 2020-02-04 | End: 2020-05-04

## 2020-02-05 LAB
EST. AVERAGE GLUCOSE BLD GHB EST-MCNC: 123 MG/DL (ref 68–126)
HBA1C MFR BLD HPLC: 5.9 % (ref ?–5.7)

## 2020-02-08 NOTE — PROGRESS NOTES
HPI:    Patient ID: Pamela Bernal is a 77year old male.     HPI  Patient presents with:  Moles: strange moles on legs  Pain: on LT leg   Medication Request: follow up for med refills   Diabetes: DM check for foot for DM shoes     Review of Systems   Alejandra PLUS) W/DEVICE Does not apply Kit test once a day as directed       Allergies:No Known Allergies   PHYSICAL EXAM:   Physical Exam   Cardiovascular: Normal rate and regular rhythm.    Pulmonary/Chest: Effort normal and breath sounds normal.   Abdominal: Soft DAILY IN THE MORNING WITH BREAKFAST       Imaging & Referrals:  None       #[phone]

## 2020-03-24 ENCOUNTER — NURSE TRIAGE (OUTPATIENT)
Dept: FAMILY MEDICINE CLINIC | Facility: CLINIC | Age: 70
End: 2020-03-24

## 2020-03-24 DIAGNOSIS — S99.922A FOOT INJURY, LEFT, INITIAL ENCOUNTER: ICD-10-CM

## 2020-03-24 PROCEDURE — G2012 BRIEF CHECK IN BY MD/QHP: HCPCS | Performed by: FAMILY MEDICINE

## 2020-03-24 NOTE — TELEPHONE ENCOUNTER
Action Requested: Summary for Provider     []  Critical Lab, Recommendations Needed  [] Need Additional Advice  []   FYI    []   Need Orders  [] Need Medications Sent to Pharmacy  [x]  Other     SUMMARY: LLE swelling since last night.   Patient also injured

## 2020-03-24 NOTE — TELEPHONE ENCOUNTER
Virtual/Telephone Check-In    Theone Pablo verbally consents to a Virtual/Telephone Check-In service on 03/24/20. Patient understands and accepts financial responsibility for any deductible, co-insurance and/or co-pays associated with this service.     Du

## 2020-05-04 ENCOUNTER — VIRTUAL PHONE E/M (OUTPATIENT)
Dept: FAMILY MEDICINE CLINIC | Facility: CLINIC | Age: 70
End: 2020-05-04
Payer: MEDICARE

## 2020-05-04 DIAGNOSIS — D63.1 ANEMIA DUE TO STAGE 3 CHRONIC KIDNEY DISEASE (HCC): ICD-10-CM

## 2020-05-04 DIAGNOSIS — R74.8 ELEVATED ALKALINE PHOSPHATASE LEVEL: ICD-10-CM

## 2020-05-04 DIAGNOSIS — N18.30 ANEMIA DUE TO STAGE 3 CHRONIC KIDNEY DISEASE (HCC): ICD-10-CM

## 2020-05-04 DIAGNOSIS — I48.20 CHRONIC ATRIAL FIBRILLATION (HCC): Primary | ICD-10-CM

## 2020-05-04 PROCEDURE — 99442 PHONE E/M BY PHYS 11-20 MIN: CPT | Performed by: FAMILY MEDICINE

## 2020-05-04 RX ORDER — ALFUZOSIN HYDROCHLORIDE 10 MG/1
TABLET, EXTENDED RELEASE ORAL
Qty: 90 TABLET | Refills: 0 | Status: SHIPPED | OUTPATIENT
Start: 2020-05-04 | End: 2020-07-16

## 2020-05-04 RX ORDER — HYDRALAZINE HYDROCHLORIDE 50 MG/1
50 TABLET, FILM COATED ORAL EVERY 8 HOURS SCHEDULED
Qty: 270 TABLET | Refills: 0 | Status: SHIPPED | OUTPATIENT
Start: 2020-05-04 | End: 2020-07-16

## 2020-05-04 RX ORDER — ATORVASTATIN CALCIUM 20 MG/1
20 TABLET, FILM COATED ORAL NIGHTLY
Qty: 90 TABLET | Refills: 0 | Status: SHIPPED | OUTPATIENT
Start: 2020-05-04 | End: 2020-07-16

## 2020-05-04 RX ORDER — FUROSEMIDE 40 MG/1
40 TABLET ORAL DAILY
Qty: 90 TABLET | Refills: 0 | Status: SHIPPED | OUTPATIENT
Start: 2020-05-04 | End: 2020-07-16

## 2020-05-04 RX ORDER — METOPROLOL SUCCINATE 50 MG/1
TABLET, EXTENDED RELEASE ORAL
Qty: 90 TABLET | Refills: 0 | Status: SHIPPED | OUTPATIENT
Start: 2020-05-04 | End: 2020-07-16

## 2020-05-04 RX ORDER — AMLODIPINE BESYLATE 10 MG/1
10 TABLET ORAL
Qty: 90 TABLET | Refills: 0 | Status: SHIPPED | OUTPATIENT
Start: 2020-05-04 | End: 2020-07-16

## 2020-05-04 NOTE — PROGRESS NOTES
Virtual Telephone Check-In    Aydee Monique verbally consents to a Virtual/Telephone Check-In visit on 05/04/20. Patient understands and accepts financial responsibility for any deductible, co-insurance and/or co-pays associated with this service.     Du Tab; Take 1 tablet (40 mg total) by mouth daily. -     hydrALAzine HCl 50 MG Oral Tab; Take 1 tablet (50 mg total) by mouth every 8 (eight) hours.   -     Metoprolol Succinate ER 50 MG Oral Tablet 24 Hr; TAKE 1 TABLET BY MOUTH ONCE DAILY BETA  BLOCKER

## 2020-06-17 ENCOUNTER — LAB ENCOUNTER (OUTPATIENT)
Dept: LAB | Age: 70
End: 2020-06-17
Attending: FAMILY MEDICINE
Payer: MEDICARE

## 2020-06-17 DIAGNOSIS — D63.1 ANEMIA DUE TO STAGE 3 CHRONIC KIDNEY DISEASE (HCC): ICD-10-CM

## 2020-06-17 DIAGNOSIS — N18.30 ANEMIA DUE TO STAGE 3 CHRONIC KIDNEY DISEASE (HCC): ICD-10-CM

## 2020-06-17 PROCEDURE — 36415 COLL VENOUS BLD VENIPUNCTURE: CPT

## 2020-06-17 PROCEDURE — 80053 COMPREHEN METABOLIC PANEL: CPT

## 2020-06-17 PROCEDURE — 85025 COMPLETE CBC W/AUTO DIFF WBC: CPT

## 2020-07-15 ENCOUNTER — OFFICE VISIT (OUTPATIENT)
Dept: PODIATRY CLINIC | Facility: CLINIC | Age: 70
End: 2020-07-15
Payer: MEDICARE

## 2020-07-15 ENCOUNTER — TELEPHONE (OUTPATIENT)
Dept: FAMILY MEDICINE CLINIC | Facility: CLINIC | Age: 70
End: 2020-07-15

## 2020-07-15 DIAGNOSIS — B35.1 ONYCHOMYCOSIS: ICD-10-CM

## 2020-07-15 DIAGNOSIS — E11.42 TYPE 2 DIABETES MELLITUS WITH DIABETIC POLYNEUROPATHY, WITH LONG-TERM CURRENT USE OF INSULIN (HCC): Primary | ICD-10-CM

## 2020-07-15 DIAGNOSIS — Z79.4 TYPE 2 DIABETES MELLITUS WITH DIABETIC POLYNEUROPATHY, WITH LONG-TERM CURRENT USE OF INSULIN (HCC): Primary | ICD-10-CM

## 2020-07-15 PROCEDURE — 11720 DEBRIDE NAIL 1-5: CPT | Performed by: PODIATRIST

## 2020-07-15 PROCEDURE — 99203 OFFICE O/P NEW LOW 30 MIN: CPT | Performed by: PODIATRIST

## 2020-07-15 PROCEDURE — G0463 HOSPITAL OUTPT CLINIC VISIT: HCPCS | Performed by: PODIATRIST

## 2020-07-15 PROCEDURE — 11721 DEBRIDE NAIL 6 OR MORE: CPT | Performed by: PODIATRIST

## 2020-07-15 NOTE — TELEPHONE ENCOUNTER
----- Message from Annamaria Roland DO sent at 7/6/2020  9:00 AM CDT -----  Please call patient and inform that the laboratory results are acceptable range. The kidney function is slow but stable.   Recommend continue current medication and follow up with hi

## 2020-07-15 NOTE — PROGRESS NOTES
HPI:    Patient ID: Mike Kaye is a 72year old male. This pleasant 69-year-old diabetic presents as a new patient to me on referral from [address] [place] [address]. .  Patient states that he is here on referral for the purpose of diabetic foot evaluation and care riya Does not apply Kit 1 Device by Does not apply route 4 (four) times daily before meals and nightly. 1 kit 0   • aspirin (ASPIRIN ADULT LOW STRENGTH) 81 MG Oral Tab EC Take  by mouth.      • Glucose Blood (FREESTYLE LITE TEST) In Vitro Strip place 1 by [employer]

## 2020-07-16 ENCOUNTER — OFFICE VISIT (OUTPATIENT)
Dept: FAMILY MEDICINE CLINIC | Facility: CLINIC | Age: 70
End: 2020-07-16
Payer: MEDICARE

## 2020-07-16 VITALS
RESPIRATION RATE: 14 BRPM | TEMPERATURE: 98 F | SYSTOLIC BLOOD PRESSURE: 153 MMHG | BODY MASS INDEX: 21 KG/M2 | DIASTOLIC BLOOD PRESSURE: 73 MMHG | HEART RATE: 60 BPM | HEIGHT: 68 IN

## 2020-07-16 DIAGNOSIS — Z79.4 TYPE 2 DIABETES MELLITUS WITH STAGE 3 CHRONIC KIDNEY DISEASE, WITH LONG-TERM CURRENT USE OF INSULIN (HCC): ICD-10-CM

## 2020-07-16 DIAGNOSIS — N18.30 ANEMIA DUE TO STAGE 3 CHRONIC KIDNEY DISEASE (HCC): Primary | ICD-10-CM

## 2020-07-16 DIAGNOSIS — E11.22 TYPE 2 DIABETES MELLITUS WITH STAGE 3 CHRONIC KIDNEY DISEASE, WITH LONG-TERM CURRENT USE OF INSULIN (HCC): ICD-10-CM

## 2020-07-16 DIAGNOSIS — D63.1 ANEMIA DUE TO STAGE 3 CHRONIC KIDNEY DISEASE (HCC): Primary | ICD-10-CM

## 2020-07-16 DIAGNOSIS — N18.30 TYPE 2 DIABETES MELLITUS WITH STAGE 3 CHRONIC KIDNEY DISEASE, WITH LONG-TERM CURRENT USE OF INSULIN (HCC): ICD-10-CM

## 2020-07-16 DIAGNOSIS — Z89.511 HX OF BKA, RIGHT (HCC): ICD-10-CM

## 2020-07-16 DIAGNOSIS — I25.10 CORONARY ARTERY DISEASE INVOLVING NATIVE CORONARY ARTERY OF NATIVE HEART WITHOUT ANGINA PECTORIS: ICD-10-CM

## 2020-07-16 PROCEDURE — 99214 OFFICE O/P EST MOD 30 MIN: CPT | Performed by: FAMILY MEDICINE

## 2020-07-16 PROCEDURE — G0463 HOSPITAL OUTPT CLINIC VISIT: HCPCS | Performed by: FAMILY MEDICINE

## 2020-07-16 RX ORDER — FUROSEMIDE 40 MG/1
40 TABLET ORAL DAILY
Qty: 90 TABLET | Refills: 1 | Status: SHIPPED | OUTPATIENT
Start: 2020-07-16 | End: 2021-01-14

## 2020-07-16 RX ORDER — METOPROLOL SUCCINATE 50 MG/1
TABLET, EXTENDED RELEASE ORAL
Qty: 90 TABLET | Refills: 1 | Status: SHIPPED | OUTPATIENT
Start: 2020-07-16 | End: 2021-01-14

## 2020-07-16 RX ORDER — ATORVASTATIN CALCIUM 20 MG/1
20 TABLET, FILM COATED ORAL NIGHTLY
Qty: 90 TABLET | Refills: 1 | Status: SHIPPED | OUTPATIENT
Start: 2020-07-16 | End: 2021-01-14

## 2020-07-16 RX ORDER — HYDRALAZINE HYDROCHLORIDE 50 MG/1
50 TABLET, FILM COATED ORAL EVERY 8 HOURS SCHEDULED
Qty: 270 TABLET | Refills: 1 | Status: SHIPPED | OUTPATIENT
Start: 2020-07-16 | End: 2021-01-14

## 2020-07-16 RX ORDER — ALFUZOSIN HYDROCHLORIDE 10 MG/1
TABLET, EXTENDED RELEASE ORAL
Qty: 90 TABLET | Refills: 1 | Status: SHIPPED | OUTPATIENT
Start: 2020-07-16 | End: 2021-01-14

## 2020-07-16 RX ORDER — AMLODIPINE BESYLATE 10 MG/1
10 TABLET ORAL
Qty: 90 TABLET | Refills: 1 | Status: SHIPPED | OUTPATIENT
Start: 2020-07-16 | End: 2021-01-14

## 2020-07-16 NOTE — PROGRESS NOTES
HPI:    Patient ID: Tristen Dempsey is a 72year old male. HPI  Patient presents for follow-up of type 2 diabetes mellitus insulin controlled. 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   He finds it very difficulty Oral Tab EC Take  by mouth.      • Glucose Blood (FREESTYLE LITE TEST) In Vitro Strip place 1 by Intradermal route  every day     • Glucose Blood (ACCU-CHEK DANNY) In Vitro Strip place 1 by Intradermal route  every day     • Blood Glucose Monitoring Suppl ( mouth once daily. • atorvastatin 20 MG Oral Tab 90 tablet 1     Sig: Take 1 tablet (20 mg total) by mouth nightly. • furosemide 40 MG Oral Tab 90 tablet 1     Sig: Take 1 tablet (40 mg total) by mouth daily.    • hydrALAzine HCl 50 MG Oral Tab 270 table

## 2020-07-29 ENCOUNTER — OFFICE VISIT (OUTPATIENT)
Dept: DERMATOLOGY CLINIC | Facility: CLINIC | Age: 70
End: 2020-07-29
Payer: MEDICARE

## 2020-07-29 DIAGNOSIS — L82.1 SEBORRHEIC KERATOSES: ICD-10-CM

## 2020-07-29 DIAGNOSIS — D48.5 NEOPLASM OF UNCERTAIN BEHAVIOR OF SKIN: Primary | ICD-10-CM

## 2020-07-29 DIAGNOSIS — D23.9 BENIGN NEOPLASM OF SKIN, UNSPECIFIED LOCATION: ICD-10-CM

## 2020-07-29 PROCEDURE — G0463 HOSPITAL OUTPT CLINIC VISIT: HCPCS | Performed by: DERMATOLOGY

## 2020-07-29 PROCEDURE — 88305 TISSUE EXAM BY PATHOLOGIST: CPT | Performed by: DERMATOLOGY

## 2020-07-29 PROCEDURE — 11102 TANGNTL BX SKIN SINGLE LES: CPT | Performed by: DERMATOLOGY

## 2020-07-29 PROCEDURE — 99202 OFFICE O/P NEW SF 15 MIN: CPT | Performed by: DERMATOLOGY

## 2020-07-30 ENCOUNTER — TELEPHONE (OUTPATIENT)
Dept: PODIATRY CLINIC | Facility: CLINIC | Age: 70
End: 2020-07-30

## 2020-07-30 NOTE — TELEPHONE ENCOUNTER
Per Carlos faxed over request for 7/15 OV note on 7/16 and never received anything please fax to [phone]. Thank you.

## 2020-08-03 NOTE — PROGRESS NOTES
Den Carrington is a 72year old male. HPI:     CC:  Patient presents with:  Lesion: New patient presents with mole to left upper leg x many years. Per patient, area is getting larger and bleeding x1 when aggravated. Dr. Kenny Mauricio referred.  No personal or fam by mouth every 8 (eight) hours.  270 tablet 1   • Metoprolol Succinate ER 50 MG Oral Tablet 24 Hr TAKE 1 TABLET BY MOUTH ONCE DAILY BETA  BLOCKER 90 tablet 1   • Insulin NPH & Regular 70/30 (70-30) 100 UNIT/ML Subcutaneous Suspension 20 units in AM    18 un History    Socioeconomic History      Marital status:       Spouse name: Not on file      Number of children: Not on file      Years of education: Not on file      Highest education level: Not on file    Occupational History      Not on file    Soci Reaction to local anesthetic: No    Social History Narrative      Not on file    Family History   Problem Relation Age of Onset   • Heart Attack Mother         MI       There were no vitals filed for this visit.     HPI:  Patient presents with:  Lesion: New uncertain behavior of skin  (primary encounter diagnosis)  Seborrheic keratoses  Benign neoplasm of skin, unspecified location    Diagnosis/Clinical History: pt with irritated leison, changed, traumatized  Spec 1 Description >>>>>: left lateral thigh  Spec scrubbed with alcohol. Anesthesia was obtained with 1% Xylocaine with epinephrine. The skin surrounding the lesion was placed under tension and the lesion was incised using a #15 scalpel blade.   The specimen was sent for histopathologic exam.    Arturo

## 2020-08-04 ENCOUNTER — OFFICE VISIT (OUTPATIENT)
Dept: CARDIOLOGY CLINIC | Facility: CLINIC | Age: 70
End: 2020-08-04
Payer: MEDICARE

## 2020-08-04 VITALS
WEIGHT: 148.63 LBS | TEMPERATURE: 98 F | DIASTOLIC BLOOD PRESSURE: 61 MMHG | BODY MASS INDEX: 23 KG/M2 | SYSTOLIC BLOOD PRESSURE: 143 MMHG | HEART RATE: 65 BPM

## 2020-08-04 DIAGNOSIS — I25.10 CORONARY ARTERY DISEASE INVOLVING NATIVE CORONARY ARTERY OF NATIVE HEART WITHOUT ANGINA PECTORIS: Primary | ICD-10-CM

## 2020-08-04 DIAGNOSIS — I10 ESSENTIAL HYPERTENSION: ICD-10-CM

## 2020-08-04 DIAGNOSIS — R60.0 LOCALIZED EDEMA: ICD-10-CM

## 2020-08-04 PROCEDURE — G0463 HOSPITAL OUTPT CLINIC VISIT: HCPCS | Performed by: INTERNAL MEDICINE

## 2020-08-04 PROCEDURE — 99214 OFFICE O/P EST MOD 30 MIN: CPT | Performed by: INTERNAL MEDICINE

## 2020-08-04 NOTE — PATIENT INSTRUCTIONS
To help with leg swelling keep feet elevated, avoid salt, use support stockings,walk when able    If swelling persists in 2 weeks call for additional recommendations including possible medication adjustments    Call [phone] and ask for Dr.Davalle larsen

## 2020-08-04 NOTE — PROGRESS NOTES
[address] [place] [address] NOTE    Bryan Alejo is a 72year old male. Patient presents with:   Follow - Up: CAD, Edema    HPI:   This is a pleasant 72year old male with coronary disease status post bypass in 2011 known diabetes hypertension pacemaker perip Subcutaneous Solution Inject 10 Units into the skin nightly. 1 vial 3   • acetaminophen 325 MG Oral Tab Take 2 tablets (650 mg total) by mouth every 4 (four) hours as needed.   0   • Blood Glucose Monitoring Suppl ([employer] BLOOD GLUCOSE METER) w/Device Does not distress  SKIN: no rashes,no suspicious lesions  HEENT: atraumatic, normocephalic,ears and throat are clear  NECK: supple,no adenopathy,no bruits  LUNGS: clear to auscultation  CARDIO: regular rate and rhythm,nl S1,S2,no murmur  GI: good BS's,no masses, HS

## 2020-08-06 NOTE — PROGRESS NOTES
The pathology report from last visit showed  left lateral thigh -Invasive, well-differentiated squamous cell carcinoma. Base curetted. Recommend close monitoring of the area.   At this point should be starting to heal.  If not healing completely over the

## 2020-08-06 NOTE — PROGRESS NOTES
Pt informed of pathology results and KMT's recommendations. Pt states he will call in a couple of weeks with an update and make f/u appt at that time.

## 2020-08-26 ENCOUNTER — TELEPHONE (OUTPATIENT)
Dept: FAMILY MEDICINE CLINIC | Facility: CLINIC | Age: 70
End: 2020-08-26

## 2020-08-26 NOTE — TELEPHONE ENCOUNTER
Per Estefania Merchant, she faxed last 08/18/2020 in [place] a form and a note, signed and dated and also they need the most recent office note fax to [phone].

## 2020-08-27 NOTE — TELEPHONE ENCOUNTER
Called victorino from [facility], notified no forms were received for request for pt, if possible to refaxed forms sto [phone] attn abdiel

## 2020-09-18 ENCOUNTER — MED REC SCAN ONLY (OUTPATIENT)
Dept: FAMILY MEDICINE CLINIC | Facility: CLINIC | Age: 70
End: 2020-09-18

## 2020-10-29 ENCOUNTER — TELEPHONE (OUTPATIENT)
Dept: FAMILY MEDICINE CLINIC | Facility: CLINIC | Age: 70
End: 2020-10-29

## 2020-10-29 NOTE — TELEPHONE ENCOUNTER
Daughter requesting refills on medication, per pharmacy patient has no refills. Please advise.           Alfuzosin HCl ER 10 MG Oral Tablet 24 Hr 90 tablet 1 7/16/2020    Sig:   TAKE 1 TABLET BY MOUTH ONCE DAILY IN THE MORNING WITH BREAKFAST     Route:   (n

## 2020-11-02 NOTE — TELEPHONE ENCOUNTER
Left message for Marissa Sutton letting her know there are refills remaining. She should call the pharmacy and let them know to process the refill he has remaining.  Also I stated to her that he will need a follow up in Dec 2020

## 2020-11-16 ENCOUNTER — NURSE TRIAGE (OUTPATIENT)
Dept: FAMILY MEDICINE CLINIC | Facility: CLINIC | Age: 70
End: 2020-11-16

## 2020-11-16 DIAGNOSIS — R05.9 COUGH: Primary | ICD-10-CM

## 2020-11-16 RX ORDER — AZITHROMYCIN 250 MG/1
TABLET, FILM COATED ORAL
Qty: 6 TABLET | Refills: 0 | Status: SHIPPED | OUTPATIENT
Start: 2020-11-16 | End: 2020-11-21

## 2020-11-16 NOTE — TELEPHONE ENCOUNTER
Call returned. No fever some chills. Moist cough. No SOB or CP.mild headache. Test for COVID ordered. Start abx due to his medical disposition. ED if not better. He agrees.

## 2020-11-16 NOTE — TELEPHONE ENCOUNTER
Please reply to pool: EM RN TRIAGE    Action Requested: Summary for Provider     []  Critical Lab, Recommendations Needed  [x] Need Additional Advice  []   FYI    [x]   Need Orders  [] Need Medications Sent to Pharmacy  []  Other     SUMMARY: Daughter

## 2020-11-17 NOTE — TELEPHONE ENCOUNTER
Nicholette Colon called for patient. She states she is the only one who takes care of them, and schedules his appts. She states her mother informed her a medication was sent to pharmacy. I confirmed that is correct.  Had her call pharmacy to s

## 2020-11-23 ENCOUNTER — TELEPHONE (OUTPATIENT)
Dept: FAMILY MEDICINE CLINIC | Facility: CLINIC | Age: 70
End: 2020-11-23

## 2020-11-23 NOTE — TELEPHONE ENCOUNTER
Action Requested: Summary for Provider     []  Critical Lab, Recommendations Needed  [] Need Additional Advice  []   FYI    []   Need Orders  [] Need Medications Sent to Pharmacy  []  Other     SUMMARY: pt going to ER for weakness.     Per step daughter she

## 2020-11-29 ENCOUNTER — TELEPHONE (OUTPATIENT)
Dept: FAMILY MEDICINE CLINIC | Facility: CLINIC | Age: 70
End: 2020-11-29

## 2020-11-29 NOTE — TELEPHONE ENCOUNTER
Patient's daughter Navid Marinelli paged me to give me update regarding patient's condition. She states that her father has been coughing for the last few days and sounds very congested. He has not been eating well.   She states his blood sugar went down to 33 a

## 2020-11-30 NOTE — TELEPHONE ENCOUNTER
The patient stated feels better. His blood sugar this morning was 91. He did not take his insulin today for is not eating. The patient stated has fatigue, weakness, sneezed and \"felt a pop in his back. \" which is getting better 3/10.   He is not a

## 2020-12-01 ENCOUNTER — VIRTUAL PHONE E/M (OUTPATIENT)
Dept: FAMILY MEDICINE CLINIC | Facility: CLINIC | Age: 70
End: 2020-12-01
Payer: MEDICARE

## 2020-12-01 DIAGNOSIS — R06.02 SHORTNESS OF BREATH: Primary | ICD-10-CM

## 2020-12-01 PROCEDURE — 99441 PHONE E/M BY PHYS 5-10 MIN: CPT | Performed by: FAMILY MEDICINE

## 2020-12-01 NOTE — PROGRESS NOTES
Virtual Telephone Check-In    Bryan Alejo verbally consents to a Virtual/Telephone Check-In visit on 12/01/20. Patient has been referred to the [facility] website at [email or web address] to review the yearly Consent to Treat document.     Patient Compa Bob

## 2021-01-14 ENCOUNTER — OFFICE VISIT (OUTPATIENT)
Dept: FAMILY MEDICINE CLINIC | Facility: CLINIC | Age: 71
End: 2021-01-14
Payer: MEDICARE

## 2021-01-14 ENCOUNTER — LAB ENCOUNTER (OUTPATIENT)
Dept: LAB | Age: 71
End: 2021-01-14
Attending: FAMILY MEDICINE
Payer: MEDICARE

## 2021-01-14 VITALS
TEMPERATURE: 97 F | SYSTOLIC BLOOD PRESSURE: 126 MMHG | HEART RATE: 64 BPM | DIASTOLIC BLOOD PRESSURE: 66 MMHG | RESPIRATION RATE: 16 BRPM

## 2021-01-14 DIAGNOSIS — Z89.511 HX OF BKA, RIGHT (HCC): ICD-10-CM

## 2021-01-14 DIAGNOSIS — E11.22 TYPE 2 DIABETES MELLITUS WITH STAGE 3 CHRONIC KIDNEY DISEASE, WITH LONG-TERM CURRENT USE OF INSULIN (HCC): ICD-10-CM

## 2021-01-14 DIAGNOSIS — N18.30 TYPE 2 DIABETES MELLITUS WITH STAGE 3 CHRONIC KIDNEY DISEASE, WITH LONG-TERM CURRENT USE OF INSULIN, UNSPECIFIED WHETHER STAGE 3A OR 3B CKD (HCC): ICD-10-CM

## 2021-01-14 DIAGNOSIS — I65.23 BILATERAL CAROTID ARTERY STENOSIS: ICD-10-CM

## 2021-01-14 DIAGNOSIS — N18.30 TYPE 2 DIABETES MELLITUS WITH STAGE 3 CHRONIC KIDNEY DISEASE, WITH LONG-TERM CURRENT USE OF INSULIN (HCC): ICD-10-CM

## 2021-01-14 DIAGNOSIS — Z79.4 TYPE 2 DIABETES MELLITUS WITH STAGE 3 CHRONIC KIDNEY DISEASE, WITH LONG-TERM CURRENT USE OF INSULIN (HCC): ICD-10-CM

## 2021-01-14 DIAGNOSIS — I25.10 CORONARY ARTERY DISEASE INVOLVING NATIVE CORONARY ARTERY OF NATIVE HEART WITHOUT ANGINA PECTORIS: ICD-10-CM

## 2021-01-14 DIAGNOSIS — E11.22 TYPE 2 DIABETES MELLITUS WITH STAGE 3 CHRONIC KIDNEY DISEASE, WITH LONG-TERM CURRENT USE OF INSULIN, UNSPECIFIED WHETHER STAGE 3A OR 3B CKD (HCC): ICD-10-CM

## 2021-01-14 DIAGNOSIS — Z79.4 TYPE 2 DIABETES MELLITUS WITH STAGE 3 CHRONIC KIDNEY DISEASE, WITH LONG-TERM CURRENT USE OF INSULIN, UNSPECIFIED WHETHER STAGE 3A OR 3B CKD (HCC): ICD-10-CM

## 2021-01-14 DIAGNOSIS — I48.20 CHRONIC ATRIAL FIBRILLATION (HCC): Primary | ICD-10-CM

## 2021-01-14 LAB
ANION GAP SERPL CALC-SCNC: 5 MMOL/L (ref 0–18)
BUN BLD-MCNC: 33 MG/DL (ref 7–18)
BUN/CREAT SERPL: 17.9 (ref 10–20)
CALCIUM BLD-MCNC: 9.3 MG/DL (ref 8.5–10.1)
CHLORIDE SERPL-SCNC: 106 MMOL/L (ref 98–112)
CO2 SERPL-SCNC: 27 MMOL/L (ref 21–32)
CREAT BLD-MCNC: 1.84 MG/DL
CREAT UR-SCNC: 34.2 MG/DL
EST. AVERAGE GLUCOSE BLD GHB EST-MCNC: 117 MG/DL (ref 68–126)
GLUCOSE BLD-MCNC: 119 MG/DL (ref 70–99)
HBA1C MFR BLD HPLC: 5.7 % (ref ?–5.7)
MICROALBUMIN UR-MCNC: 66.7 MG/DL
MICROALBUMIN/CREAT 24H UR-RTO: 1950.3 UG/MG (ref ?–30)
OSMOLALITY SERPL CALC.SUM OF ELEC: 294 MOSM/KG (ref 275–295)
PATIENT FASTING Y/N/NP: YES
POTASSIUM SERPL-SCNC: 4.6 MMOL/L (ref 3.5–5.1)
SODIUM SERPL-SCNC: 138 MMOL/L (ref 136–145)

## 2021-01-14 PROCEDURE — 82570 ASSAY OF URINE CREATININE: CPT

## 2021-01-14 PROCEDURE — 36415 COLL VENOUS BLD VENIPUNCTURE: CPT

## 2021-01-14 PROCEDURE — 82043 UR ALBUMIN QUANTITATIVE: CPT

## 2021-01-14 PROCEDURE — 80048 BASIC METABOLIC PNL TOTAL CA: CPT

## 2021-01-14 PROCEDURE — 99214 OFFICE O/P EST MOD 30 MIN: CPT | Performed by: FAMILY MEDICINE

## 2021-01-14 PROCEDURE — 83036 HEMOGLOBIN GLYCOSYLATED A1C: CPT

## 2021-01-14 RX ORDER — ALFUZOSIN HYDROCHLORIDE 10 MG/1
TABLET, EXTENDED RELEASE ORAL
Qty: 90 TABLET | Refills: 1 | Status: SHIPPED | OUTPATIENT
Start: 2021-01-14 | End: 2021-08-09

## 2021-01-14 RX ORDER — FUROSEMIDE 40 MG/1
40 TABLET ORAL DAILY
Qty: 90 TABLET | Refills: 1 | Status: SHIPPED | OUTPATIENT
Start: 2021-01-14 | End: 2021-08-09

## 2021-01-14 RX ORDER — AMLODIPINE BESYLATE 10 MG/1
10 TABLET ORAL
Qty: 90 TABLET | Refills: 1 | Status: SHIPPED | OUTPATIENT
Start: 2021-01-14 | End: 2021-08-09

## 2021-01-14 RX ORDER — METOPROLOL SUCCINATE 50 MG/1
TABLET, EXTENDED RELEASE ORAL
Qty: 90 TABLET | Refills: 1 | Status: SHIPPED | OUTPATIENT
Start: 2021-01-14 | End: 2021-08-09

## 2021-01-14 RX ORDER — ATORVASTATIN CALCIUM 20 MG/1
20 TABLET, FILM COATED ORAL NIGHTLY
Qty: 90 TABLET | Refills: 1 | Status: SHIPPED | OUTPATIENT
Start: 2021-01-14 | End: 2021-08-09

## 2021-01-14 RX ORDER — HYDRALAZINE HYDROCHLORIDE 50 MG/1
50 TABLET, FILM COATED ORAL EVERY 8 HOURS SCHEDULED
Qty: 270 TABLET | Refills: 1 | Status: SHIPPED | OUTPATIENT
Start: 2021-01-14 | End: 2021-08-09

## 2021-01-15 NOTE — PROGRESS NOTES
HPI:    Patient ID: Mike Kaye is a 72year old male. HPI  Patient presents with:  HTN  Hyperlipidemia  Diabetes  Medication Request: 80 supply  He and his wife are moving to [place] near the daughter and will be taking up with physicians out of state. Solution Inject 10 Units into the skin nightly. 1 vial 3   • acetaminophen 325 MG Oral Tab Take 2 tablets (650 mg total) by mouth every 4 (four) hours as needed.   0   • Blood Glucose Monitoring Suppl ([employer] BLOOD GLUCOSE METER) w/Device Does not apply Kit 1 amLODIPine Besylate 10 MG Oral Tab 90 tablet 1     Sig: Take 1 tablet (10 mg total) by mouth once daily. • atorvastatin 20 MG Oral Tab 90 tablet 1     Sig: Take 1 tablet (20 mg total) by mouth nightly.    • furosemide 40 MG Oral Tab 90 tablet 1     Sig: T

## 2021-01-20 ENCOUNTER — OFFICE VISIT (OUTPATIENT)
Dept: PODIATRY CLINIC | Facility: CLINIC | Age: 71
End: 2021-01-20
Payer: MEDICARE

## 2021-01-20 VITALS — HEIGHT: 68 IN | WEIGHT: 150 LBS | BODY MASS INDEX: 22.73 KG/M2

## 2021-01-20 DIAGNOSIS — B35.1 ONYCHOMYCOSIS: ICD-10-CM

## 2021-01-20 DIAGNOSIS — Z79.4 TYPE 2 DIABETES MELLITUS WITH DIABETIC POLYNEUROPATHY, WITH LONG-TERM CURRENT USE OF INSULIN (HCC): Primary | ICD-10-CM

## 2021-01-20 DIAGNOSIS — E11.42 TYPE 2 DIABETES MELLITUS WITH DIABETIC POLYNEUROPATHY, WITH LONG-TERM CURRENT USE OF INSULIN (HCC): Primary | ICD-10-CM

## 2021-01-20 PROCEDURE — 11720 DEBRIDE NAIL 1-5: CPT | Performed by: PODIATRIST

## 2021-01-20 NOTE — PROGRESS NOTES
HPI:    Patient ID: Jessica Zhou is a 72year old male. This 32-year-old diabetic presents for care associated with his left foot. The last time I saw him was July 2020. He saw Alba Delcid on January 14 of 2021.   His most recent A1c was 5.7 and his fa Monitoring Suppl (ACCU-CHEK DANNY PLUS) W/DEVICE Does not apply Kit test once a day as directed       Allergies:No Known Allergies   PHYSICAL EXAM:     Physical exam the dorsalis pedis pulses noted but diminished I was unable to elicit posterior tibial pul

## 2021-01-26 DIAGNOSIS — Z23 NEED FOR VACCINATION: ICD-10-CM

## 2021-03-15 DIAGNOSIS — Z23 NEED FOR VACCINATION: ICD-10-CM

## 2021-08-05 ENCOUNTER — TELEPHONE (OUTPATIENT)
Dept: CARDIOLOGY CLINIC | Facility: CLINIC | Age: 71
End: 2021-08-05

## 2021-08-10 NOTE — TELEPHONE ENCOUNTER
Please review. Protocol failed / No protocol.   Requested Prescriptions   Pending Prescriptions Disp Refills    Alfuzosin HCl ER 10 MG Oral Tablet 24 Hr 30 tablet 1     Sig: TAKE 1 TABLET BY MOUTH ONCE DAILY IN THE MORNING WITH BREAKFAST        There is no Passed - CMP or BMP in past 12 months           metoprolol succinate 50 MG Oral Tablet 24 Hr 30 tablet 0     Sig: TAKE 1 TABLET BY MOUTH ONCE DAILY BETA  BLOCKER        Hypertensive Medications Protocol Failed - 8/9/2021  2:17 PM        Failed - Wilfred

## 2021-08-13 RX ORDER — HYDRALAZINE HYDROCHLORIDE 50 MG/1
50 TABLET, FILM COATED ORAL EVERY 8 HOURS SCHEDULED
Qty: 90 TABLET | Refills: 0 | Status: SHIPPED | OUTPATIENT
Start: 2021-08-13

## 2021-08-13 RX ORDER — FUROSEMIDE 40 MG/1
40 TABLET ORAL DAILY
Qty: 90 TABLET | Refills: 0 | Status: SHIPPED | OUTPATIENT
Start: 2021-08-13

## 2021-08-13 RX ORDER — ALFUZOSIN HYDROCHLORIDE 10 MG/1
TABLET, EXTENDED RELEASE ORAL
Qty: 90 TABLET | Refills: 0 | Status: SHIPPED | OUTPATIENT
Start: 2021-08-13

## 2021-08-13 RX ORDER — ATORVASTATIN CALCIUM 20 MG/1
20 TABLET, FILM COATED ORAL NIGHTLY
Qty: 90 TABLET | Refills: 0 | Status: SHIPPED | OUTPATIENT
Start: 2021-08-13

## 2021-08-13 RX ORDER — METOPROLOL SUCCINATE 50 MG/1
TABLET, EXTENDED RELEASE ORAL
Qty: 90 TABLET | Refills: 0 | Status: SHIPPED | OUTPATIENT
Start: 2021-08-13

## 2021-08-13 RX ORDER — AMLODIPINE BESYLATE 10 MG/1
10 TABLET ORAL
Qty: 90 TABLET | Refills: 0 | Status: SHIPPED | OUTPATIENT
Start: 2021-08-13

## 2021-08-17 RX ORDER — HYDRALAZINE HYDROCHLORIDE 50 MG/1
TABLET, FILM COATED ORAL
Qty: 270 TABLET | Refills: 0 | OUTPATIENT
Start: 2021-08-17

## 2021-08-17 RX ORDER — ATORVASTATIN CALCIUM 20 MG/1
TABLET, FILM COATED ORAL
Qty: 90 TABLET | Refills: 0 | OUTPATIENT
Start: 2021-08-17

## 2021-08-17 RX ORDER — FUROSEMIDE 40 MG/1
TABLET ORAL
Qty: 90 TABLET | Refills: 0 | OUTPATIENT
Start: 2021-08-17

## 2021-08-17 RX ORDER — AMLODIPINE BESYLATE 10 MG/1
TABLET ORAL
Qty: 90 TABLET | Refills: 0 | OUTPATIENT
Start: 2021-08-17

## 2021-08-17 RX ORDER — METOPROLOL SUCCINATE 50 MG/1
TABLET, EXTENDED RELEASE ORAL
Qty: 90 TABLET | Refills: 0 | OUTPATIENT
Start: 2021-08-17

## 2021-08-17 NOTE — TELEPHONE ENCOUNTER
Duplicate request denied--medications already ordered 8/13/2021--[employer] System message sent to patient to schedule office visit with Dr. Jeremy Chiu.     CSS--please call patient to schedule office visit with Dr. Jeremy Chiu

## 2023-01-31 NOTE — H&P
[facility]    PATIENT'S NAME: Taylor Daniels PHYSICIAN: Cherry Castillo MD   PATIENT ACCOUNT#:   [id number]    LOCATION:  [place] [address]  MEDICAL RECORD #:   [id number]       YOB: 1950  ADMISSION DATE:       06/14/201 drug use. Lives with his family and is usually independent with basic activities of daily living.     REVIEW OF SYSTEMS:  The patient said he was wearing shoes that caused a skin breakage at the dorsum of his right forefoot, and since then he has been deve arterial disease. 3.   Coronary artery disease. 4.   Chronic kidney disease stage 3.   5.   Diabetes mellitus type 2. The patient will be admitted to general medical floor. IV fluids. IV cefepime and vancomycin.   Podiatry consult and Infectiou Patient/Caregiver provided printed discharge information.

## 2023-03-04 ENCOUNTER — TELEPHONE (OUTPATIENT)
Dept: FAMILY MEDICINE CLINIC | Facility: CLINIC | Age: 73
End: 2023-03-04

## 2023-03-04 NOTE — TELEPHONE ENCOUNTER
Pt moved out of state changing providers due to move, noted in [address] [place] [address] Avenue on 1/14/2021

## 2023-07-31 ENCOUNTER — TELEPHONE (OUTPATIENT)
Dept: FAMILY MEDICINE CLINIC | Facility: CLINIC | Age: 73
End: 2023-07-31

## 2023-09-13 ENCOUNTER — TELEPHONE (OUTPATIENT)
Dept: FAMILY MEDICINE CLINIC | Facility: CLINIC | Age: 73
End: 2023-09-13

## (undated) DEVICE — SUCTION CANISTER, 3000CC,SAFELINER: Brand: DEROYAL

## (undated) DEVICE — SOL  .9 3000ML

## (undated) DEVICE — SUPER SPONGES,MEDIUM: Brand: KERLIX

## (undated) DEVICE — ENCORE® LATEX ACCLAIM SIZE 8, STERILE LATEX POWDER-FREE SURGICAL GLOVE: Brand: ENCORE

## (undated) DEVICE — Device

## (undated) DEVICE — BLADE SAW SAGITTAL 19.5

## (undated) DEVICE — SUTURE VICRYL 2-0 CT-1

## (undated) DEVICE — BATTERY

## (undated) DEVICE — CHLORAPREP 26ML APPLICATOR

## (undated) DEVICE — SOL  .9 1000ML BTL

## (undated) DEVICE — SPONGE LAP 18X18 XRAY STRL

## (undated) DEVICE — GAMMEX® PI HYBRID SIZE 6.5, STERILE POWDER-FREE SURGICAL GLOVE, POLYISOPRENE AND NEOPRENE BLEND: Brand: GAMMEX

## (undated) DEVICE — LOWER EXTREMITY: Brand: MEDLINE INDUSTRIES, INC.

## (undated) DEVICE — 3M™ STERI-DRAPE™ U-DRAPE 1015: Brand: STERI-DRAPE™

## (undated) DEVICE — ZIMMER® STERILE DISPOSABLE TOURNIQUET CUFF WITH PLC, DUAL PORT, SINGLE BLADDER, 42 IN. (107 CM)

## (undated) DEVICE — BANDAGE ROLL,100% COTTON, 6 PLY, LARGE: Brand: KERLIX

## (undated) DEVICE — ZIMMER® STERILE DISPOSABLE TOURNIQUET CUFF WITH PLC, DUAL PORT, SINGLE BLADDER, 30 IN. (76 CM)

## (undated) DEVICE — PROXIMATE SKIN STAPLERS (35 WIDE) CONTAINS 35 STAINLESS STEEL STAPLES (FIXED HEAD): Brand: PROXIMATE

## (undated) DEVICE — GAUZE SPONGES,12 PLY: Brand: CURITY

## (undated) DEVICE — ABDOMINAL PAD: Brand: CURITY

## (undated) DEVICE — SUTURE ETHILON 2-0 FS

## (undated) DEVICE — STERILE TETRA-FLEX CF, ELASTIC BANDAGE LATEX FREE 6IN X5.5 YD: Brand: TETRA-FLEX™CF

## (undated) DEVICE — SUTURE VICRYL 0 CT-1

## (undated) DEVICE — OCCLUSIVE GAUZE STRIP,3% BISMUTH TRIBROMOPHENATE IN PETROLATUM BLEND: Brand: XEROFORM

## (undated) NOTE — LETTER
July 15, 2020         Laly Escobar DO  [address] [facility]      Patient: Mike Kaye   YOB: 1950   Date of Visit: 7/15/2020       Dear Dr. Joanna Perez DO,    I saw your patient, Mike Kaye, on 7/15/2020.  Enclosed is m

## (undated) NOTE — LETTER
2/26/2019              Mk Jean        [address]         Dear Mable Franco records indicate that the test, Lexiscan Stress Test,  ordered for you by Shanel Pacheco MD has not been done.   If you have, in fact, a

## (undated) NOTE — ED AVS SNAPSHOT
Amor Liriano   MRN: [id number]    Department:  [facility] Emergency Department   Date of Visit:  7/23/2019           Disclosure     Insurance plans vary and the physician(s) referred by the ER may not be covered by your plan.  Please contact yo within the next three months to obtain basic health screening including reassessment of your blood pressure.     IF THERE IS ANY CHANGE OR WORSENING OF YOUR CONDITION, CALL YOUR PRIMARY CARE PHYSICIAN AT ONCE OR RETURN IMMEDIATELY TO THE EMERGENCY DEPARTMEN

## (undated) NOTE — IP AVS SNAPSHOT
Patient Demographics     Address  [address] [facility] [address] Phone  [phone] [facility]) *Preferred*  [phone] [facility])      Emergency Contact(s)     Name Relation Home Work [address] [place] [address],[address]   [phone]      Allergies as of Take 2 tablets (650 mg total) by mouth every 4 (four) hours as needed. Marizol Cano MD         Alfuzosin HCl ER 10 MG Tb24  Commonly known as:  UROXATRAL  Next dose due:   Tomorrow morning with breakfast.      TAKE 1 TABLET BY MOUTH ONCE DAILY IN [address] [facility] Next dose due:  6/22/19 evening      Take 1 tablet (50 mg total) by mouth every 8 (eight) hours. TRA Zambrano         HYDROcodone-acetaminophen 5-325 MG Tabs  Commonly known as:  Jayson Villagran  Next dose due:  As needed every four hours for pain. [id number] HYDROcodone-acetaminophen (NORCO) 5-325 MG per tab 2 tablet 06/22/19 1411 Given      [id number] Meropenem (MERREM) 500 mg in sodium chloride 0.9% 100 mL MBP 06/21/19 1526 New Bag      [id number] Metoprolol Succinate ER (Toprol XL) 24 hr tab 50 mg SpO2  96 % Filed at 06/22/2019 1409      Patient's Most Recent Weight       Most Recent Value   Patient Weight  79.5 kg (175 lb 4.3 oz)         Lab Results Last 24 Hours      BASIC METABOLIC PANEL (8) [[id number]] (Abnormal)  Resulted: 06/22/19 1028, Result 9733 [facility] Suellyn Epley M.D. [address]  [facility] [address] 04/29/14 1047 - Present            Microbiology Results (All)     Procedure Component Value Units Date/Time    Blood Culture FREQ X 2 [[id number]] Collected:  06/ documented history of noncompliance, who came into the emergency department today with 3 weeks' complaint of right foot progressive dark discoloration and foul-smelling odor. The patient had a white blood cell count of 22.2, hemoglobin of 9.2, left shift. GENERAL:  Alert and oriented to time, place, and person. Mild distress. VITAL SIGNS:  Temperature 100.6, pulse 86, respiratory rate 16, blood pressure 153/61, pulse ox 98% on room air. HEENT:  Atraumatic. Oropharynx clear. Moist mucous membranes. monitor his hemodynamic status closely. Further recommendations to follow.     Dictated By Vikas Tapia MD  d: 06/14/2019 18:51:40  t: 06/14/2019 19:02:11  [facility] [id number]  DV/  [FB.1]  Electronically signed by Smitha Pimentel MD on 6/17/2019 gangrene on the plantar surface of the foot all the way almost to the hindfoot as well as the necrotic toes dorsally. The patient and his wife were in the room and I discussed my findings. I recommended below-knee amputation.     The wife provided the tru drainage and weeping with necrosis of about 50% of the plantar surface extending proximally toward the distal end of the calcaneal region. The leg itself had some hair shelter down. The leg was warm.   There was 1+ edema, but there did not appear to be an to surgery tomorrow afternoon pending surgeon and operating room availability. Thank you for this consult. Dictated By Augustine Paez. Amira Poe MD  d: 06/18/2019 15:30:23  t: 06/18/2019 15:55:52  Job [id number]  [facility]/    cc:  Eleni Millan, and creatinine of 2.59, GFR of 24, which is slightly below his baseline. The patient had an x-ray of the foot, which is still pending, started on IV vancomycin and Zosyn and he will be admitted to the hospital for further management.      DISCHARGE DX:Shahriar DVT proph: Heparin subcu    CULTURE:   Hospital Encounter on 06/14/19   1.  BLOOD CULTURE     Status: None (Preliminary result)    Collection Time: 06/17/19  7:24 AM   Result Value Ref Range    Blood Culture Result No Growth 4 Days N/A       IMAGING STUDIES MCH 29.6 29.6 29.5   MCHC 33.3 33.0 32.5   RDW 13.3 13.3 13.4   NEPRELIM 14.14* 14.31* 11.58*   WBC 18.2* 16.7* 14.3*   .0 396.0 376.0     Recent Labs   Lab 06/17/19  0724 06/18/19  0630 06/19/19  0735 06/20/19  0730 06/21/19  0721   * 246* 1 Commonly known as:  LEVEMIR      Inject 10 Units into the skin daily. Refills:  0     lactulose 10 GM/15ML Soln  Commonly known as:  CHRONULAC      Take 30 mL (20 g total) by mouth 2 (two) times daily as needed.    Refills:  0     Metoprolol Succinate ER Commonly known as:  Daniel Fajardo 70/30 RELION              Where to Get Your Medications      These medications were sent to [facility] [address] ALETHEA HERNANDEZ [address] [facility], [facility] [place] [address] [phone], [address], [facility] [address]    Phone Discharge Summary signed by Claudette Carlisle MD at 6/22/2019  8:41 AM  Version 1 of 2    Author:  Claudette Carlisle MD Service:  Hospitalist Author Type:  Physician    Filed:  6/22/2019  8:41 AM Date of Service:  6/22/2019  8:39 AM Status:  Signed    Ed Seen by podiatry, Dr. Newt Fleischer, the foot was not salvageable  US shows moderate to severe peripheral artery disease  -cont IV Abx for now with IV meropenem and vancomycin  -s/p posterior tibial artery balloon angioplasty 6/17  by IR   -s/p BKA 6/19  -plan r at 19:03     Approved by (CST): Ferny Chery MD on 6/14/2019 at 19:04          Us Arterial Duplex Lower Extremity Right (cpt=93926)    Result Date: 6/17/2019  CONCLUSION: Right lower extremity Doppler indicative of moderate to severe below the k  108 108 109 108   CO2 19.0* 20.0* 21.0 18.0* 19.0*   ALKPHO 144*  --   --   --   --    AST 9*  --   --   --   --    ALT 12*  --   --   --   --    BILT 0.4  --   --   --   --    TP 6.0*  --   --   --   --      Lab Results   Component Value Date    IN furosemide 40 MG Tabs  Commonly known as:  LASIX  What changed:    · how much to take  · how to take this  · when to take this  · additional instructions      This medication will be on hold until told otherwise by your doctor   Quantity:  90 tablet  Refil Please  your prescriptions at the location directed by your doctor or nurse    Bring a paper prescription for each of these medications  · HYDROcodone-acetaminophen 5-325 MG Tabs         Follow up Visits  Constantino Ovalle MD  [address] Transthoracic Echocardiography M-mode, complete 2D, complete spectral Doppler, and color Doppler (REPORT AMENDED ) ------------------------------------------------------------------- Daniele Godfrey 69 04/22/1950 H: [id number] ------------------------------------------------------------------- Study data:  Comparison was made to the study of 10/25/2018. Study status:  Elective. Procedure:  Transthoracic echocardiography.  The study was technically limited due to poor acoustic w normal. Systolic function was normal. Ventricular septum:   Septal motion showed paradox. These changes are consistent with a left bundle branch block. Pulmonic valve: The valve appears to be grossly normal. Doppler:    There was no evidence for stenosis cm/sec 8.58       ---------  LV E/e&apos;, lateral                 3.8          10.6       ---------  LV e&apos;, medial                    5.75  cm/sec 5.85       ---------  LV E/e&apos;, medial                  6            15.6       ---------  LV e&apo ---------  RV pressure, S, DP               58    mm Hg  25         --------- Legend: (L)  and  (H)  milton values outside specified reference range.  AMENDED - Electronically signed       06/18/2019 11:40 Vipul Hernandez MD        Cath Angiogram Results (H PT Discharge Recommendations: Acute rehabilitation[VIC.2]     PLAN[VIC.1]  PT Treatment Plan: Bed mobility; Endurance; Patient education;Gait training;Strengthening;Transfer training;Balance training[VIC.2]    SUBJECTIVE  I can try, whatever you want me to do. Additional information: importance of mobility, le ex    THERAPEUTIC EXERCISES  Lower Extremity Ankle pumps  Hip AB/AD  Heel slides     Position reclined position in chair[VIC.1]       Patient End of Session: Up in chair;Needs met;Call light within reach;RN Physician Order: PT Eval and Treat    Presenting Problem: right bka  Reason for Therapy: Mobility Dysfunction and Discharge Planning    PHYSICAL THERAPY ASSESSMENT     Patient is a 77year old male admitted 6/14/2019 for necrotic toes s/p right bka, nwb.  P • ADHD (attention deficit hyperactivity disorder)    • Atherosclerosis of coronary artery    • Other and unspecified hyperlipidemia    • Type II or unspecified type diabetes mellitus without mention of complication, not stated as uncontrolled    • Unspecif BALANCE  Static Sitting: Poor  Dynamic Sitting: Poor  Static Standing: Poor  Dynamic Standing: Poor    ADDITIONAL TESTS  Additional Tests: None            ACTIVITY TOLERANCE  Pulse: 91  Heart Rate Source: Monitor     BP: 129/68  BP Location: Left arm  BP M Goal #2 Patient is able to demonstrate transfers Sit to/from Stand at assistance level: minimum assistance with walker - rolling     Goal #2  Current Status    Goal #3 Patient is able to ambulate 5 feet with assist device: walker - rolling at assistance le static standing w/ significant assist for 15 sec. Pt required mod a x 2 for spt bed to chair. Pt presenting as discouraged by present situation. Importance of continued practice to make progress reinforced.  Pt would benefit from continued skilled OT to add -   Putting on and taking off regular upper body clothing?: A Little  -   Taking care of personal grooming such as brushing teeth?: None  -   Eating meals?: None[YORDAN. 2]    AM-PAC Score:[YORDAN.1]  Score: 17  Approx Degree of Impairment: 50.11%  Standardized Sco Evaluation Date: 6/20/2019  Type of Evaluation: Initial[SF.1]  Presenting Problem: (s/p RT BKA)[SF.2]    Physician Order: IP Consult to Occupational Therapy  Reason for Therapy: ADL/IADL Dysfunction and Discharge Planning    OCCUPATIONAL THERAPY ASSESSMENT training; Endurance training;Patient/Family training;Equipment eval/education; Compensatory technique education[SF.2]       OCCUPATIONAL THERAPY MEDICAL/SOCIAL HISTORY     Problem List[SF.1]   Principal Problem:    Necrotic toes ([address]  Active Problems:    Ga Weight Bearing Restriction: R lower extremity        R Lower Extremity: Non-Weight Bearing[SF.2]       PAIN ASSESSMENT[SF.1]  Rating: 3  Location: (RT LE)  Management Techniques: Repositioning[SF.2]    ACTIVITY TOLERANCE  Fair- requires frequent rest break Patient will complete LE dressing with min A from bed level  Comment:     Patient will complete functional transfer with min A  Comment:    Pt will perform bed mobility with min A for participation in supine self care  Comment:     Comment:         Goals jazmine, pt able to demo unsupported sit balance with close supervision. Provide min A x2 for sit to stand from bed with use of R/W. Pt tolerates ~30 seconds before requesting to sit.  Provide min A x2 for bed to chair SPT without AD with pt requiring in • COLONOSCOPY & POLYPECTOMY  05/11/2011    per [employer]: EGD/colonoscopy    • KNEE AMPUTATION ABOVE/BELOW Right 6/19/2019    Performed by Garrett Lancaster MD at [address] [place] [address] OR   • UPPER GI ENDOSCOPY,BIOPSY  [id number]    per [employer]: EGD with biopsy[SF.2] -   Taking care of personal grooming such as brushing teeth?: None  -   Eating meals?: None[SF.2]    AM-PAC Score:[SF.1]  Score: 17  Approx Degree of Impairment: 50.11%  Standardized Score (AM-PAC Scale): 37.26  CMS Modifier (G-Code): CK[SF.2]    FUNCTIONA

## (undated) NOTE — ED AVS SNAPSHOT
Aydee Monique   MRN: [id number]    Department:  [facility] Emergency Department   Date of Visit:  8/10/2019           Disclosure     Insurance plans vary and the physician(s) referred by the ER may not be covered by your plan.  Please contact yo within the next three months to obtain basic health screening including reassessment of your blood pressure.     IF THERE IS ANY CHANGE OR WORSENING OF YOUR CONDITION, CALL YOUR PRIMARY CARE PHYSICIAN AT ONCE OR RETURN IMMEDIATELY TO THE EMERGENCY DEPARTMEN

## (undated) NOTE — IP AVS SNAPSHOT
[facility]            (For Outpatient Use Only) Initial Admit Date: 6/14/2019   Inpt/Obs Admit Date: Inpt: 6/14/19 / Obs: N/A   Discharge Date:    Popeye Roger:  [de-identified]   MRN: [de-identified]   CSN: [id number]   CEID: [id number]        [facility] Financial Class: Medicare    June 22, 2019